# Patient Record
Sex: FEMALE | Race: WHITE | NOT HISPANIC OR LATINO | Employment: PART TIME | URBAN - METROPOLITAN AREA
[De-identification: names, ages, dates, MRNs, and addresses within clinical notes are randomized per-mention and may not be internally consistent; named-entity substitution may affect disease eponyms.]

---

## 2017-01-31 ENCOUNTER — COMMUNICATION - HEALTHEAST (OUTPATIENT)
Dept: FAMILY MEDICINE | Facility: CLINIC | Age: 47
End: 2017-01-31

## 2017-02-22 ENCOUNTER — RECORDS - HEALTHEAST (OUTPATIENT)
Dept: ADMINISTRATIVE | Facility: OTHER | Age: 47
End: 2017-02-22

## 2017-03-06 ENCOUNTER — COMMUNICATION - HEALTHEAST (OUTPATIENT)
Dept: FAMILY MEDICINE | Facility: CLINIC | Age: 47
End: 2017-03-06

## 2017-03-06 DIAGNOSIS — F41.1 GENERALIZED ANXIETY DISORDER: ICD-10-CM

## 2017-03-06 DIAGNOSIS — F40.01 AGORAPHOBIA WITH PANIC DISORDER: ICD-10-CM

## 2017-03-31 ENCOUNTER — AMBULATORY - HEALTHEAST (OUTPATIENT)
Dept: LAB | Facility: CLINIC | Age: 47
End: 2017-03-31

## 2017-03-31 ENCOUNTER — AMBULATORY - HEALTHEAST (OUTPATIENT)
Dept: FAMILY MEDICINE | Facility: CLINIC | Age: 47
End: 2017-03-31

## 2017-03-31 ENCOUNTER — COMMUNICATION - HEALTHEAST (OUTPATIENT)
Dept: FAMILY MEDICINE | Facility: CLINIC | Age: 47
End: 2017-03-31

## 2017-03-31 DIAGNOSIS — E11.9 TYPE 2 DIABETES MELLITUS (H): ICD-10-CM

## 2017-03-31 DIAGNOSIS — E11.21 DIABETIC NEPHROPATHY ASSOCIATED WITH TYPE 2 DIABETES MELLITUS (H): ICD-10-CM

## 2017-03-31 LAB
CHOLEST SERPL-MCNC: 134 MG/DL
FASTING STATUS PATIENT QL REPORTED: NO
HBA1C MFR BLD: 5.6 % (ref 3.5–6)
HDLC SERPL-MCNC: 54 MG/DL
LDLC SERPL CALC-MCNC: 64 MG/DL
TRIGL SERPL-MCNC: 78 MG/DL

## 2017-04-20 ENCOUNTER — COMMUNICATION - HEALTHEAST (OUTPATIENT)
Dept: FAMILY MEDICINE | Facility: CLINIC | Age: 47
End: 2017-04-20

## 2017-05-08 ENCOUNTER — COMMUNICATION - HEALTHEAST (OUTPATIENT)
Dept: FAMILY MEDICINE | Facility: CLINIC | Age: 47
End: 2017-05-08

## 2017-05-12 ENCOUNTER — COMMUNICATION - HEALTHEAST (OUTPATIENT)
Dept: FAMILY MEDICINE | Facility: CLINIC | Age: 47
End: 2017-05-12

## 2017-07-03 ENCOUNTER — COMMUNICATION - HEALTHEAST (OUTPATIENT)
Dept: FAMILY MEDICINE | Facility: CLINIC | Age: 47
End: 2017-07-03

## 2017-07-03 DIAGNOSIS — F41.1 GENERALIZED ANXIETY DISORDER: ICD-10-CM

## 2017-07-03 DIAGNOSIS — F40.01 AGORAPHOBIA WITH PANIC DISORDER: ICD-10-CM

## 2017-10-16 ENCOUNTER — COMMUNICATION - HEALTHEAST (OUTPATIENT)
Dept: FAMILY MEDICINE | Facility: CLINIC | Age: 47
End: 2017-10-16

## 2017-10-16 DIAGNOSIS — E11.21 DIABETIC NEPHROPATHY ASSOCIATED WITH TYPE 2 DIABETES MELLITUS (H): ICD-10-CM

## 2017-10-18 ENCOUNTER — COMMUNICATION - HEALTHEAST (OUTPATIENT)
Dept: FAMILY MEDICINE | Facility: CLINIC | Age: 47
End: 2017-10-18

## 2017-10-18 DIAGNOSIS — F41.1 GENERALIZED ANXIETY DISORDER: ICD-10-CM

## 2017-10-18 DIAGNOSIS — E11.9 TYPE 2 DIABETES MELLITUS (H): ICD-10-CM

## 2017-10-19 ENCOUNTER — HOSPITAL ENCOUNTER (OUTPATIENT)
Dept: MAMMOGRAPHY | Facility: HOSPITAL | Age: 47
Discharge: HOME | End: 2017-10-19
Attending: FAMILY MEDICINE

## 2017-10-19 DIAGNOSIS — Z12.31 VISIT FOR SCREENING MAMMOGRAM: ICD-10-CM

## 2017-11-16 ENCOUNTER — COMMUNICATION - HEALTHEAST (OUTPATIENT)
Dept: FAMILY MEDICINE | Facility: CLINIC | Age: 47
End: 2017-11-16

## 2017-11-16 DIAGNOSIS — F41.1 GENERALIZED ANXIETY DISORDER: ICD-10-CM

## 2017-11-16 DIAGNOSIS — E11.9 TYPE 2 DIABETES MELLITUS (H): ICD-10-CM

## 2017-12-05 ENCOUNTER — OFFICE VISIT - HEALTHEAST (OUTPATIENT)
Dept: FAMILY MEDICINE | Facility: CLINIC | Age: 47
End: 2017-12-05

## 2017-12-05 ENCOUNTER — COMMUNICATION - HEALTHEAST (OUTPATIENT)
Dept: FAMILY MEDICINE | Facility: CLINIC | Age: 47
End: 2017-12-05

## 2017-12-05 DIAGNOSIS — F40.01 AGORAPHOBIA WITH PANIC DISORDER: ICD-10-CM

## 2017-12-05 DIAGNOSIS — F41.1 GENERALIZED ANXIETY DISORDER: ICD-10-CM

## 2017-12-05 DIAGNOSIS — E11.21 DIABETIC NEPHROPATHY ASSOCIATED WITH TYPE 2 DIABETES MELLITUS (H): ICD-10-CM

## 2017-12-05 DIAGNOSIS — B00.9 HERPES SIMPLEX VIRUS (HSV) INFECTION: ICD-10-CM

## 2017-12-05 DIAGNOSIS — E78.5 HYPERLIPIDEMIA: ICD-10-CM

## 2017-12-05 DIAGNOSIS — E11.9 TYPE 2 DIABETES MELLITUS (H): ICD-10-CM

## 2017-12-05 LAB
CHOLEST SERPL-MCNC: 168 MG/DL
FASTING STATUS PATIENT QL REPORTED: YES
HDLC SERPL-MCNC: 54 MG/DL
LDLC SERPL CALC-MCNC: 98 MG/DL
TRIGL SERPL-MCNC: 80 MG/DL

## 2017-12-05 ASSESSMENT — MIFFLIN-ST. JEOR: SCORE: 1409.67

## 2018-01-04 ENCOUNTER — RECORDS - HEALTHEAST (OUTPATIENT)
Dept: ADMINISTRATIVE | Facility: OTHER | Age: 48
End: 2018-01-04

## 2018-03-22 ENCOUNTER — COMMUNICATION - HEALTHEAST (OUTPATIENT)
Dept: FAMILY MEDICINE | Facility: CLINIC | Age: 48
End: 2018-03-22

## 2018-03-22 DIAGNOSIS — F40.01 AGORAPHOBIA WITH PANIC DISORDER: ICD-10-CM

## 2018-03-22 DIAGNOSIS — F41.1 GENERALIZED ANXIETY DISORDER: ICD-10-CM

## 2018-09-12 ENCOUNTER — COMMUNICATION - HEALTHEAST (OUTPATIENT)
Dept: FAMILY MEDICINE | Facility: CLINIC | Age: 48
End: 2018-09-12

## 2018-09-12 DIAGNOSIS — E11.9 TYPE 2 DIABETES MELLITUS (H): ICD-10-CM

## 2018-09-13 ENCOUNTER — COMMUNICATION - HEALTHEAST (OUTPATIENT)
Dept: FAMILY MEDICINE | Facility: CLINIC | Age: 48
End: 2018-09-13

## 2018-09-13 DIAGNOSIS — F40.01 AGORAPHOBIA WITH PANIC DISORDER: ICD-10-CM

## 2018-09-13 DIAGNOSIS — F41.1 GENERALIZED ANXIETY DISORDER: ICD-10-CM

## 2018-09-27 ENCOUNTER — COMMUNICATION - HEALTHEAST (OUTPATIENT)
Dept: FAMILY MEDICINE | Facility: CLINIC | Age: 48
End: 2018-09-27

## 2018-09-27 DIAGNOSIS — E11.9 TYPE 2 DIABETES MELLITUS (H): ICD-10-CM

## 2018-10-29 ENCOUNTER — OFFICE VISIT - HEALTHEAST (OUTPATIENT)
Dept: FAMILY MEDICINE | Facility: CLINIC | Age: 48
End: 2018-10-29

## 2018-10-29 DIAGNOSIS — E11.9 TYPE 2 DIABETES MELLITUS (H): ICD-10-CM

## 2018-10-29 DIAGNOSIS — E78.5 HYPERLIPIDEMIA: ICD-10-CM

## 2018-10-29 DIAGNOSIS — E66.3 OVERWEIGHT (BMI 25.0-29.9): ICD-10-CM

## 2018-10-29 DIAGNOSIS — F41.1 GENERALIZED ANXIETY DISORDER: ICD-10-CM

## 2018-10-29 DIAGNOSIS — Z23 NEED FOR IMMUNIZATION AGAINST INFLUENZA: ICD-10-CM

## 2018-10-29 LAB
ALBUMIN SERPL-MCNC: 4 G/DL (ref 3.5–5)
ALP SERPL-CCNC: 43 U/L (ref 45–120)
ALT SERPL W P-5'-P-CCNC: 26 U/L (ref 0–45)
AMPHETAMINES UR QL SCN: NORMAL
ANION GAP SERPL CALCULATED.3IONS-SCNC: 9 MMOL/L (ref 5–18)
AST SERPL W P-5'-P-CCNC: 19 U/L (ref 0–40)
BARBITURATES UR QL: NORMAL
BENZODIAZ UR QL: NORMAL
BILIRUB SERPL-MCNC: 0.4 MG/DL (ref 0–1)
BUN SERPL-MCNC: 16 MG/DL (ref 8–22)
CALCIUM SERPL-MCNC: 9.7 MG/DL (ref 8.5–10.5)
CANNABINOIDS UR QL SCN: NORMAL
CHLORIDE BLD-SCNC: 102 MMOL/L (ref 98–107)
CHOLEST SERPL-MCNC: 160 MG/DL
CO2 SERPL-SCNC: 24 MMOL/L (ref 22–31)
COCAINE UR QL: NORMAL
CREAT SERPL-MCNC: 0.94 MG/DL (ref 0.6–1.1)
CREAT UR-MCNC: 148.5 MG/DL
CREAT UR-MCNC: 154.1 MG/DL
FASTING STATUS PATIENT QL REPORTED: YES
GFR SERPL CREATININE-BSD FRML MDRD: >60 ML/MIN/1.73M2
GLUCOSE BLD-MCNC: 142 MG/DL (ref 70–125)
HBA1C MFR BLD: 7.7 % (ref 3.5–6)
HDLC SERPL-MCNC: 45 MG/DL
LDLC SERPL CALC-MCNC: 96 MG/DL
MICROALBUMIN UR-MCNC: <0.5 MG/DL (ref 0–1.99)
MICROALBUMIN/CREAT UR: NORMAL MG/G
OPIATES UR QL SCN: NORMAL
OXYCODONE UR QL: NORMAL
PCP UR QL SCN: NORMAL
POTASSIUM BLD-SCNC: 4.7 MMOL/L (ref 3.5–5)
PROT SERPL-MCNC: 7 G/DL (ref 6–8)
SODIUM SERPL-SCNC: 135 MMOL/L (ref 136–145)
TRIGL SERPL-MCNC: 93 MG/DL
TSH SERPL DL<=0.005 MIU/L-ACNC: 1.46 UIU/ML (ref 0.3–5)

## 2018-10-29 ASSESSMENT — MIFFLIN-ST. JEOR: SCORE: 1464.11

## 2018-11-14 ENCOUNTER — COMMUNICATION - HEALTHEAST (OUTPATIENT)
Dept: FAMILY MEDICINE | Facility: CLINIC | Age: 48
End: 2018-11-14

## 2018-11-20 ENCOUNTER — AMBULATORY - HEALTHEAST (OUTPATIENT)
Dept: EDUCATION SERVICES | Facility: CLINIC | Age: 48
End: 2018-11-20

## 2018-11-20 DIAGNOSIS — E11.9 DIABETES MELLITUS (H): ICD-10-CM

## 2018-12-20 ENCOUNTER — COMMUNICATION - HEALTHEAST (OUTPATIENT)
Dept: FAMILY MEDICINE | Facility: CLINIC | Age: 48
End: 2018-12-20

## 2018-12-20 DIAGNOSIS — E11.9 TYPE 2 DIABETES MELLITUS (H): ICD-10-CM

## 2018-12-20 DIAGNOSIS — E78.5 HYPERLIPIDEMIA: ICD-10-CM

## 2018-12-23 ENCOUNTER — COMMUNICATION - HEALTHEAST (OUTPATIENT)
Dept: FAMILY MEDICINE | Facility: CLINIC | Age: 48
End: 2018-12-23

## 2018-12-23 DIAGNOSIS — E11.9 TYPE 2 DIABETES MELLITUS (H): ICD-10-CM

## 2018-12-23 DIAGNOSIS — E11.21 DIABETIC NEPHROPATHY ASSOCIATED WITH TYPE 2 DIABETES MELLITUS (H): ICD-10-CM

## 2018-12-23 DIAGNOSIS — F41.1 GENERALIZED ANXIETY DISORDER: ICD-10-CM

## 2018-12-26 ENCOUNTER — COMMUNICATION - HEALTHEAST (OUTPATIENT)
Dept: FAMILY MEDICINE | Facility: CLINIC | Age: 48
End: 2018-12-26

## 2018-12-26 DIAGNOSIS — F40.01 AGORAPHOBIA WITH PANIC DISORDER: ICD-10-CM

## 2018-12-26 DIAGNOSIS — F41.1 GENERALIZED ANXIETY DISORDER: ICD-10-CM

## 2018-12-28 ENCOUNTER — HOSPITAL ENCOUNTER (OUTPATIENT)
Dept: MAMMOGRAPHY | Facility: CLINIC | Age: 48
Discharge: HOME | End: 2018-12-28
Attending: FAMILY MEDICINE

## 2018-12-28 DIAGNOSIS — Z12.31 VISIT FOR SCREENING MAMMOGRAM: ICD-10-CM

## 2019-02-15 ENCOUNTER — COMMUNICATION - HEALTHEAST (OUTPATIENT)
Dept: FAMILY MEDICINE | Facility: CLINIC | Age: 49
End: 2019-02-15

## 2019-02-15 DIAGNOSIS — E11.9 TYPE 2 DIABETES MELLITUS WITHOUT COMPLICATION, WITHOUT LONG-TERM CURRENT USE OF INSULIN (H): ICD-10-CM

## 2019-02-15 DIAGNOSIS — E87.1 HYPONATREMIA: ICD-10-CM

## 2019-03-11 ENCOUNTER — AMBULATORY - HEALTHEAST (OUTPATIENT)
Dept: LAB | Facility: CLINIC | Age: 49
End: 2019-03-11

## 2019-03-11 DIAGNOSIS — E11.9 TYPE 2 DIABETES MELLITUS WITHOUT COMPLICATION, WITHOUT LONG-TERM CURRENT USE OF INSULIN (H): ICD-10-CM

## 2019-03-11 DIAGNOSIS — E87.1 HYPONATREMIA: ICD-10-CM

## 2019-03-11 LAB
HBA1C MFR BLD: 6.2 % (ref 3.5–6)
SODIUM SERPL-SCNC: 138 MMOL/L (ref 136–145)

## 2019-03-29 ENCOUNTER — COMMUNICATION - HEALTHEAST (OUTPATIENT)
Dept: EDUCATION SERVICES | Facility: CLINIC | Age: 49
End: 2019-03-29

## 2019-03-29 DIAGNOSIS — E11.9 DIABETES MELLITUS (H): ICD-10-CM

## 2019-04-26 ENCOUNTER — COMMUNICATION - HEALTHEAST (OUTPATIENT)
Dept: FAMILY MEDICINE | Facility: CLINIC | Age: 49
End: 2019-04-26

## 2019-05-02 ENCOUNTER — RECORDS - HEALTHEAST (OUTPATIENT)
Dept: ADMINISTRATIVE | Facility: OTHER | Age: 49
End: 2019-05-02

## 2019-05-09 ENCOUNTER — AMBULATORY - HEALTHEAST (OUTPATIENT)
Dept: FAMILY MEDICINE | Facility: CLINIC | Age: 49
End: 2019-05-09

## 2019-05-09 DIAGNOSIS — E11.65 TYPE 2 DIABETES MELLITUS WITH HYPERGLYCEMIA, WITHOUT LONG-TERM CURRENT USE OF INSULIN (H): ICD-10-CM

## 2019-07-16 ENCOUNTER — AMBULATORY - HEALTHEAST (OUTPATIENT)
Dept: FAMILY MEDICINE | Facility: CLINIC | Age: 49
End: 2019-07-16

## 2019-07-26 ENCOUNTER — COMMUNICATION - HEALTHEAST (OUTPATIENT)
Dept: EDUCATION SERVICES | Facility: CLINIC | Age: 49
End: 2019-07-26

## 2019-07-26 ENCOUNTER — COMMUNICATION - HEALTHEAST (OUTPATIENT)
Dept: FAMILY MEDICINE | Facility: CLINIC | Age: 49
End: 2019-07-26

## 2019-07-26 DIAGNOSIS — E11.9 DIABETES MELLITUS (H): ICD-10-CM

## 2019-08-26 ENCOUNTER — COMMUNICATION - HEALTHEAST (OUTPATIENT)
Dept: FAMILY MEDICINE | Facility: CLINIC | Age: 49
End: 2019-08-26

## 2019-09-12 ENCOUNTER — COMMUNICATION - HEALTHEAST (OUTPATIENT)
Dept: FAMILY MEDICINE | Facility: CLINIC | Age: 49
End: 2019-09-12

## 2019-09-12 DIAGNOSIS — F40.01 AGORAPHOBIA WITH PANIC DISORDER: ICD-10-CM

## 2019-09-12 DIAGNOSIS — F41.1 GENERALIZED ANXIETY DISORDER: ICD-10-CM

## 2019-10-10 ENCOUNTER — OFFICE VISIT - HEALTHEAST (OUTPATIENT)
Dept: FAMILY MEDICINE | Facility: CLINIC | Age: 49
End: 2019-10-10

## 2019-10-10 DIAGNOSIS — E11.29 TYPE 2 DIABETES MELLITUS WITH MICROALBUMINURIA, WITHOUT LONG-TERM CURRENT USE OF INSULIN (H): ICD-10-CM

## 2019-10-10 DIAGNOSIS — R80.9 TYPE 2 DIABETES MELLITUS WITH MICROALBUMINURIA, WITHOUT LONG-TERM CURRENT USE OF INSULIN (H): ICD-10-CM

## 2019-10-10 DIAGNOSIS — F41.1 GENERALIZED ANXIETY DISORDER: ICD-10-CM

## 2019-10-10 DIAGNOSIS — Z12.31 VISIT FOR SCREENING MAMMOGRAM: ICD-10-CM

## 2019-10-10 DIAGNOSIS — E78.5 HYPERLIPIDEMIA, UNSPECIFIED HYPERLIPIDEMIA TYPE: ICD-10-CM

## 2019-10-10 DIAGNOSIS — E66.3 OVERWEIGHT (BMI 25.0-29.9): ICD-10-CM

## 2019-10-10 DIAGNOSIS — Z00.00 HEALTH CARE MAINTENANCE: ICD-10-CM

## 2019-10-10 DIAGNOSIS — F40.01 AGORAPHOBIA WITH PANIC DISORDER: ICD-10-CM

## 2019-10-10 LAB
ALBUMIN SERPL-MCNC: 4.2 G/DL (ref 3.5–5)
ALP SERPL-CCNC: 35 U/L (ref 45–120)
ALT SERPL W P-5'-P-CCNC: 20 U/L (ref 0–45)
ANION GAP SERPL CALCULATED.3IONS-SCNC: 9 MMOL/L (ref 5–18)
AST SERPL W P-5'-P-CCNC: 18 U/L (ref 0–40)
BILIRUB SERPL-MCNC: 0.5 MG/DL (ref 0–1)
BUN SERPL-MCNC: 10 MG/DL (ref 8–22)
CALCIUM SERPL-MCNC: 9.7 MG/DL (ref 8.5–10.5)
CHLORIDE BLD-SCNC: 102 MMOL/L (ref 98–107)
CHOLEST SERPL-MCNC: 141 MG/DL
CO2 SERPL-SCNC: 25 MMOL/L (ref 22–31)
CREAT SERPL-MCNC: 0.87 MG/DL (ref 0.6–1.1)
CREAT UR-MCNC: 194.8 MG/DL
FASTING STATUS PATIENT QL REPORTED: YES
GFR SERPL CREATININE-BSD FRML MDRD: >60 ML/MIN/1.73M2
GLUCOSE BLD-MCNC: 96 MG/DL (ref 70–125)
HBA1C MFR BLD: 6 % (ref 3.5–6)
HDLC SERPL-MCNC: 54 MG/DL
HIV 1+2 AB+HIV1 P24 AG SERPL QL IA: NEGATIVE
LDLC SERPL CALC-MCNC: 72 MG/DL
MICROALBUMIN UR-MCNC: 0.65 MG/DL (ref 0–1.99)
MICROALBUMIN/CREAT UR: 3.3 MG/G
POTASSIUM BLD-SCNC: 4.5 MMOL/L (ref 3.5–5)
PROT SERPL-MCNC: 7.2 G/DL (ref 6–8)
SODIUM SERPL-SCNC: 136 MMOL/L (ref 136–145)
TRIGL SERPL-MCNC: 73 MG/DL
TSH SERPL DL<=0.005 MIU/L-ACNC: 0.93 UIU/ML (ref 0.3–5)

## 2019-10-10 ASSESSMENT — MIFFLIN-ST. JEOR: SCORE: 1328.03

## 2019-10-11 ENCOUNTER — RECORDS - HEALTHEAST (OUTPATIENT)
Dept: HEALTH INFORMATION MANAGEMENT | Facility: CLINIC | Age: 49
End: 2019-10-11

## 2019-10-11 LAB
25(OH)D3 SERPL-MCNC: 37.5 NG/ML (ref 30–80)
25(OH)D3 SERPL-MCNC: 37.5 NG/ML (ref 30–80)

## 2020-01-08 ENCOUNTER — HOSPITAL ENCOUNTER (OUTPATIENT)
Dept: MAMMOGRAPHY | Facility: CLINIC | Age: 50
Discharge: HOME | End: 2020-01-08
Attending: FAMILY MEDICINE

## 2020-01-08 DIAGNOSIS — Z12.31 VISIT FOR SCREENING MAMMOGRAM: ICD-10-CM

## 2020-01-14 ENCOUNTER — HOSPITAL ENCOUNTER (OUTPATIENT)
Dept: ULTRASOUND IMAGING | Facility: CLINIC | Age: 50
Discharge: HOME | End: 2020-01-14
Attending: FAMILY MEDICINE

## 2020-01-14 DIAGNOSIS — N64.89 BREAST ASYMMETRY: ICD-10-CM

## 2020-01-16 ENCOUNTER — COMMUNICATION - HEALTHEAST (OUTPATIENT)
Dept: FAMILY MEDICINE | Facility: CLINIC | Age: 50
End: 2020-01-16

## 2020-01-20 ENCOUNTER — COMMUNICATION - HEALTHEAST (OUTPATIENT)
Dept: FAMILY MEDICINE | Facility: CLINIC | Age: 50
End: 2020-01-20

## 2020-02-13 ENCOUNTER — OFFICE VISIT - HEALTHEAST (OUTPATIENT)
Dept: FAMILY MEDICINE | Facility: CLINIC | Age: 50
End: 2020-02-13

## 2020-02-13 DIAGNOSIS — E11.9 TYPE 2 DIABETES MELLITUS (H): ICD-10-CM

## 2020-02-13 DIAGNOSIS — F41.1 GENERALIZED ANXIETY DISORDER: ICD-10-CM

## 2020-02-13 DIAGNOSIS — M25.521 RIGHT ELBOW PAIN: ICD-10-CM

## 2020-02-13 DIAGNOSIS — G56.12 MEDIAN NEUROPATHY, LEFT: ICD-10-CM

## 2020-02-13 DIAGNOSIS — G56.11 MEDIAN NEUROPATHY, RIGHT: ICD-10-CM

## 2020-02-13 ASSESSMENT — MIFFLIN-ST. JEOR: SCORE: 1309.88

## 2020-02-21 ENCOUNTER — HOSPITAL ENCOUNTER (OUTPATIENT)
Dept: PHYSICAL MEDICINE AND REHAB | Facility: CLINIC | Age: 50
Discharge: HOME | End: 2020-02-21
Attending: FAMILY MEDICINE

## 2020-02-21 ENCOUNTER — COMMUNICATION - HEALTHEAST (OUTPATIENT)
Dept: FAMILY MEDICINE | Facility: CLINIC | Age: 50
End: 2020-02-21

## 2020-02-21 DIAGNOSIS — M25.521 RIGHT ELBOW PAIN: ICD-10-CM

## 2020-02-21 DIAGNOSIS — G56.03 BILATERAL CARPAL TUNNEL SYNDROME: ICD-10-CM

## 2020-02-21 DIAGNOSIS — G56.12 MEDIAN NEUROPATHY, LEFT: ICD-10-CM

## 2020-02-21 DIAGNOSIS — G56.11 MEDIAN NEUROPATHY, RIGHT: ICD-10-CM

## 2020-06-19 ENCOUNTER — COMMUNICATION - HEALTHEAST (OUTPATIENT)
Dept: FAMILY MEDICINE | Facility: CLINIC | Age: 50
End: 2020-06-19

## 2020-06-19 DIAGNOSIS — F41.1 GENERALIZED ANXIETY DISORDER: ICD-10-CM

## 2020-06-19 DIAGNOSIS — F40.01 AGORAPHOBIA WITH PANIC DISORDER: ICD-10-CM

## 2020-06-24 ENCOUNTER — COMMUNICATION - HEALTHEAST (OUTPATIENT)
Dept: FAMILY MEDICINE | Facility: CLINIC | Age: 50
End: 2020-06-24

## 2020-09-24 ENCOUNTER — OFFICE VISIT - HEALTHEAST (OUTPATIENT)
Dept: FAMILY MEDICINE | Facility: CLINIC | Age: 50
End: 2020-09-24

## 2020-09-24 DIAGNOSIS — F40.01 AGORAPHOBIA WITH PANIC DISORDER: ICD-10-CM

## 2020-09-24 DIAGNOSIS — E11.9 DIABETES MELLITUS (H): ICD-10-CM

## 2020-09-24 DIAGNOSIS — B00.9 HERPES SIMPLEX VIRUS (HSV) INFECTION: ICD-10-CM

## 2020-09-24 DIAGNOSIS — Z23 IMMUNIZATION DUE: ICD-10-CM

## 2020-09-24 DIAGNOSIS — Z12.11 COLON CANCER SCREENING: ICD-10-CM

## 2020-09-24 DIAGNOSIS — E11.29 TYPE 2 DIABETES MELLITUS WITH MICROALBUMINURIA, WITHOUT LONG-TERM CURRENT USE OF INSULIN (H): ICD-10-CM

## 2020-09-24 DIAGNOSIS — R80.9 TYPE 2 DIABETES MELLITUS WITH MICROALBUMINURIA, WITHOUT LONG-TERM CURRENT USE OF INSULIN (H): ICD-10-CM

## 2020-09-24 DIAGNOSIS — F41.1 GENERALIZED ANXIETY DISORDER: ICD-10-CM

## 2020-09-24 DIAGNOSIS — E78.5 HYPERLIPIDEMIA, UNSPECIFIED HYPERLIPIDEMIA TYPE: ICD-10-CM

## 2020-09-24 LAB
ALBUMIN SERPL-MCNC: 4.4 G/DL (ref 3.5–5)
ALP SERPL-CCNC: 33 U/L (ref 45–120)
ALT SERPL W P-5'-P-CCNC: 14 U/L (ref 0–45)
ANION GAP SERPL CALCULATED.3IONS-SCNC: 8 MMOL/L (ref 5–18)
AST SERPL W P-5'-P-CCNC: 18 U/L (ref 0–40)
BILIRUB SERPL-MCNC: 0.5 MG/DL (ref 0–1)
BUN SERPL-MCNC: 9 MG/DL (ref 8–22)
CALCIUM SERPL-MCNC: 9.6 MG/DL (ref 8.5–10.5)
CHLORIDE BLD-SCNC: 102 MMOL/L (ref 98–107)
CO2 SERPL-SCNC: 26 MMOL/L (ref 22–31)
CREAT SERPL-MCNC: 0.82 MG/DL (ref 0.6–1.1)
CREAT UR-MCNC: 104.8 MG/DL
GFR SERPL CREATININE-BSD FRML MDRD: >60 ML/MIN/1.73M2
GLUCOSE BLD-MCNC: 78 MG/DL (ref 70–125)
HBA1C MFR BLD: 6 %
MICROALBUMIN UR-MCNC: <0.5 MG/DL (ref 0–1.99)
MICROALBUMIN/CREAT UR: NORMAL MG/G{CREAT}
POTASSIUM BLD-SCNC: 4.4 MMOL/L (ref 3.5–5)
PROT SERPL-MCNC: 7 G/DL (ref 6–8)
SODIUM SERPL-SCNC: 136 MMOL/L (ref 136–145)

## 2020-09-24 RX ORDER — VENLAFAXINE HYDROCHLORIDE 75 MG/1
75 CAPSULE, EXTENDED RELEASE ORAL DAILY
Qty: 90 CAPSULE | Refills: 3 | Status: SHIPPED | OUTPATIENT
Start: 2020-09-24 | End: 2021-12-28

## 2020-09-24 RX ORDER — GLUCOSAMINE HCL/CHONDROITIN SU 500-400 MG
1 CAPSULE ORAL DAILY
Qty: 100 STRIP | Refills: 3 | Status: SHIPPED | OUTPATIENT
Start: 2020-09-24 | End: 2023-12-31

## 2020-09-24 RX ORDER — SEMAGLUTIDE 1.34 MG/ML
0.5 INJECTION, SOLUTION SUBCUTANEOUS WEEKLY
Qty: 3 SYRINGE | Refills: 3 | Status: SHIPPED | OUTPATIENT
Start: 2020-09-24 | End: 2021-09-14

## 2020-09-24 RX ORDER — LISINOPRIL 5 MG/1
5 TABLET ORAL DAILY
Qty: 90 TABLET | Refills: 3 | Status: SHIPPED | OUTPATIENT
Start: 2020-09-24 | End: 2021-12-28

## 2020-09-24 RX ORDER — VALACYCLOVIR HYDROCHLORIDE 1 G/1
1000 TABLET, FILM COATED ORAL 2 TIMES DAILY
Qty: 10 TABLET | Refills: 6 | Status: SHIPPED | OUTPATIENT
Start: 2020-09-24 | End: 2021-11-06

## 2020-09-24 RX ORDER — SIMVASTATIN 20 MG
20 TABLET ORAL AT BEDTIME
Qty: 90 TABLET | Refills: 3 | Status: SHIPPED | OUTPATIENT
Start: 2020-09-24 | End: 2021-09-30

## 2020-11-10 ENCOUNTER — COMMUNICATION - HEALTHEAST (OUTPATIENT)
Dept: FAMILY MEDICINE | Facility: CLINIC | Age: 50
End: 2020-11-10

## 2020-11-10 ENCOUNTER — OFFICE VISIT - HEALTHEAST (OUTPATIENT)
Dept: FAMILY MEDICINE | Facility: CLINIC | Age: 50
End: 2020-11-10

## 2020-11-10 DIAGNOSIS — M54.40 ACUTE LOW BACK PAIN WITH SCIATICA, SCIATICA LATERALITY UNSPECIFIED, UNSPECIFIED BACK PAIN LATERALITY: ICD-10-CM

## 2020-11-12 ENCOUNTER — OFFICE VISIT - HEALTHEAST (OUTPATIENT)
Dept: FAMILY MEDICINE | Facility: CLINIC | Age: 50
End: 2020-11-12

## 2020-11-12 DIAGNOSIS — M54.50 ACUTE RIGHT-SIDED LOW BACK PAIN WITHOUT SCIATICA: ICD-10-CM

## 2020-11-12 RX ORDER — LIDOCAINE 50 MG/G
PATCH TOPICAL
Qty: 30 PATCH | Refills: 11 | Status: SHIPPED | OUTPATIENT
Start: 2020-11-12 | End: 2021-07-16

## 2020-11-12 RX ORDER — HYDROMORPHONE HYDROCHLORIDE 2 MG/1
2 TABLET ORAL EVERY 6 HOURS PRN
Qty: 20 TABLET | Refills: 0 | Status: SHIPPED | OUTPATIENT
Start: 2020-11-12 | End: 2021-07-16

## 2020-11-17 ENCOUNTER — COMMUNICATION - HEALTHEAST (OUTPATIENT)
Dept: FAMILY MEDICINE | Facility: CLINIC | Age: 50
End: 2020-11-17

## 2020-12-01 ENCOUNTER — COMMUNICATION - HEALTHEAST (OUTPATIENT)
Dept: FAMILY MEDICINE | Facility: CLINIC | Age: 50
End: 2020-12-01

## 2020-12-15 ENCOUNTER — COMMUNICATION - HEALTHEAST (OUTPATIENT)
Dept: FAMILY MEDICINE | Facility: CLINIC | Age: 50
End: 2020-12-15

## 2020-12-15 DIAGNOSIS — E11.29 TYPE 2 DIABETES MELLITUS WITH MICROALBUMINURIA, WITHOUT LONG-TERM CURRENT USE OF INSULIN (H): ICD-10-CM

## 2020-12-15 DIAGNOSIS — E78.5 HYPERLIPIDEMIA, UNSPECIFIED HYPERLIPIDEMIA TYPE: ICD-10-CM

## 2020-12-15 DIAGNOSIS — R80.9 TYPE 2 DIABETES MELLITUS WITH MICROALBUMINURIA, WITHOUT LONG-TERM CURRENT USE OF INSULIN (H): ICD-10-CM

## 2020-12-15 DIAGNOSIS — Z11.59 SCREENING FOR VIRAL DISEASE: ICD-10-CM

## 2020-12-15 DIAGNOSIS — R00.2 PALPITATIONS: ICD-10-CM

## 2020-12-17 ENCOUNTER — COMMUNICATION - HEALTHEAST (OUTPATIENT)
Dept: FAMILY MEDICINE | Facility: CLINIC | Age: 50
End: 2020-12-17

## 2020-12-18 ENCOUNTER — TRANSFERRED RECORDS (OUTPATIENT)
Dept: HEALTH INFORMATION MANAGEMENT | Facility: CLINIC | Age: 50
End: 2020-12-18
Payer: COMMERCIAL

## 2020-12-18 ENCOUNTER — RECORDS - HEALTHEAST (OUTPATIENT)
Dept: ADMINISTRATIVE | Facility: OTHER | Age: 50
End: 2020-12-18

## 2020-12-18 ENCOUNTER — COMMUNICATION - HEALTHEAST (OUTPATIENT)
Dept: FAMILY MEDICINE | Facility: CLINIC | Age: 50
End: 2020-12-18

## 2020-12-21 ENCOUNTER — AMBULATORY - HEALTHEAST (OUTPATIENT)
Dept: LAB | Facility: CLINIC | Age: 50
End: 2020-12-21

## 2020-12-21 ENCOUNTER — AMBULATORY - HEALTHEAST (OUTPATIENT)
Dept: NURSING | Facility: CLINIC | Age: 50
End: 2020-12-21

## 2020-12-21 DIAGNOSIS — R00.2 PALPITATIONS: ICD-10-CM

## 2020-12-21 DIAGNOSIS — R80.9 TYPE 2 DIABETES MELLITUS WITH MICROALBUMINURIA, WITHOUT LONG-TERM CURRENT USE OF INSULIN (H): ICD-10-CM

## 2020-12-21 DIAGNOSIS — E11.29 TYPE 2 DIABETES MELLITUS WITH MICROALBUMINURIA, WITHOUT LONG-TERM CURRENT USE OF INSULIN (H): ICD-10-CM

## 2020-12-21 DIAGNOSIS — E78.5 HYPERLIPIDEMIA, UNSPECIFIED HYPERLIPIDEMIA TYPE: ICD-10-CM

## 2020-12-21 DIAGNOSIS — Z11.59 SCREENING FOR VIRAL DISEASE: ICD-10-CM

## 2020-12-21 LAB
ALBUMIN SERPL-MCNC: 4.2 G/DL (ref 3.5–5)
ALP SERPL-CCNC: 31 U/L (ref 45–120)
ALT SERPL W P-5'-P-CCNC: 12 U/L (ref 0–45)
ANION GAP SERPL CALCULATED.3IONS-SCNC: 10 MMOL/L (ref 5–18)
AST SERPL W P-5'-P-CCNC: 15 U/L (ref 0–40)
BILIRUB SERPL-MCNC: 0.5 MG/DL (ref 0–1)
BUN SERPL-MCNC: 8 MG/DL (ref 8–22)
CALCIUM SERPL-MCNC: 9.1 MG/DL (ref 8.5–10.5)
CHLORIDE BLD-SCNC: 104 MMOL/L (ref 98–107)
CHOLEST SERPL-MCNC: 133 MG/DL
CO2 SERPL-SCNC: 24 MMOL/L (ref 22–31)
CREAT SERPL-MCNC: 0.75 MG/DL (ref 0.6–1.1)
FASTING STATUS PATIENT QL REPORTED: NO
GFR SERPL CREATININE-BSD FRML MDRD: >60 ML/MIN/1.73M2
GLUCOSE BLD-MCNC: 109 MG/DL (ref 70–125)
HBA1C MFR BLD: 5.4 %
HDLC SERPL-MCNC: 60 MG/DL
LDLC SERPL CALC-MCNC: 62 MG/DL
MAGNESIUM SERPL-MCNC: 1.6 MG/DL (ref 1.8–2.6)
POTASSIUM BLD-SCNC: 4.3 MMOL/L (ref 3.5–5)
PROT SERPL-MCNC: 7 G/DL (ref 6–8)
SODIUM SERPL-SCNC: 138 MMOL/L (ref 136–145)
TRIGL SERPL-MCNC: 57 MG/DL
TSH SERPL DL<=0.005 MIU/L-ACNC: 1.31 UIU/ML (ref 0.3–5)

## 2020-12-22 ENCOUNTER — AMBULATORY - HEALTHEAST (OUTPATIENT)
Dept: FAMILY MEDICINE | Facility: CLINIC | Age: 50
End: 2020-12-22

## 2020-12-22 DIAGNOSIS — I49.3 PVC (PREMATURE VENTRICULAR CONTRACTION): ICD-10-CM

## 2020-12-22 DIAGNOSIS — R00.2 PALPITATIONS: ICD-10-CM

## 2020-12-22 DIAGNOSIS — E83.42 HYPOMAGNESEMIA: ICD-10-CM

## 2020-12-22 LAB — HCV AB SERPL QL IA: NEGATIVE

## 2020-12-22 RX ORDER — MAGNESIUM CHLORIDE 71.5 G/G
1 TABLET ORAL DAILY
Qty: 100 TABLET | Refills: 1 | Status: SHIPPED | OUTPATIENT
Start: 2020-12-22 | End: 2021-07-16

## 2020-12-29 ENCOUNTER — COMMUNICATION - HEALTHEAST (OUTPATIENT)
Dept: FAMILY MEDICINE | Facility: CLINIC | Age: 50
End: 2020-12-29

## 2020-12-29 DIAGNOSIS — E11.29 TYPE 2 DIABETES MELLITUS WITH MICROALBUMINURIA, WITHOUT LONG-TERM CURRENT USE OF INSULIN (H): ICD-10-CM

## 2020-12-29 DIAGNOSIS — F41.1 GENERALIZED ANXIETY DISORDER: ICD-10-CM

## 2020-12-29 DIAGNOSIS — R80.9 TYPE 2 DIABETES MELLITUS WITH MICROALBUMINURIA, WITHOUT LONG-TERM CURRENT USE OF INSULIN (H): ICD-10-CM

## 2020-12-29 DIAGNOSIS — F40.01 AGORAPHOBIA WITH PANIC DISORDER: ICD-10-CM

## 2021-01-05 LAB
ATRIAL RATE - MUSE: 83 BPM
DIASTOLIC BLOOD PRESSURE - MUSE: NORMAL
INTERPRETATION ECG - MUSE: NORMAL
P AXIS - MUSE: 64 DEGREES
PR INTERVAL - MUSE: 132 MS
QRS DURATION - MUSE: 76 MS
QT - MUSE: 368 MS
QTC - MUSE: 432 MS
R AXIS - MUSE: -12 DEGREES
SYSTOLIC BLOOD PRESSURE - MUSE: NORMAL
T AXIS - MUSE: 51 DEGREES
VENTRICULAR RATE- MUSE: 83 BPM

## 2021-03-03 ENCOUNTER — COMMUNICATION - HEALTHEAST (OUTPATIENT)
Dept: FAMILY MEDICINE | Facility: CLINIC | Age: 51
End: 2021-03-03

## 2021-03-15 ENCOUNTER — COMMUNICATION - HEALTHEAST (OUTPATIENT)
Dept: FAMILY MEDICINE | Facility: CLINIC | Age: 51
End: 2021-03-15

## 2021-03-31 ENCOUNTER — HOSPITAL ENCOUNTER (OUTPATIENT)
Dept: MAMMOGRAPHY | Facility: CLINIC | Age: 51
Discharge: HOME | End: 2021-03-31
Attending: FAMILY MEDICINE

## 2021-03-31 DIAGNOSIS — Z12.31 VISIT FOR SCREENING MAMMOGRAM: ICD-10-CM

## 2021-04-22 ENCOUNTER — COMMUNICATION - HEALTHEAST (OUTPATIENT)
Dept: FAMILY MEDICINE | Facility: CLINIC | Age: 51
End: 2021-04-22

## 2021-04-22 DIAGNOSIS — F41.1 GENERALIZED ANXIETY DISORDER: ICD-10-CM

## 2021-04-22 DIAGNOSIS — F40.01 AGORAPHOBIA WITH PANIC DISORDER: ICD-10-CM

## 2021-04-22 RX ORDER — LORAZEPAM 1 MG/1
1 TABLET ORAL EVERY 8 HOURS PRN
Qty: 30 TABLET | Refills: 0 | Status: SHIPPED | OUTPATIENT
Start: 2021-04-22 | End: 2021-11-08

## 2021-05-27 NOTE — TELEPHONE ENCOUNTER
RN cannot approve Refill Request    RN can NOT refill this medication med is not covered by policy/route to provider     . Last office visit: 10/29/2018 Tara Elliott MD Last Physical: Visit date not found Last MTM visit: Visit date not found Last visit same specialty: Visit date not found.  Next visit within 3 mo: Visit date not found  Next physical within 3 mo: Visit date not found      Joyce You, [facility] Connection Triage/Med Refill 3/29/2019    Requested Prescriptions   Pending Prescriptions Disp Refills     OZEMPIC 0.25 mg or 0.5 mg(2 mg/1.5 mL) PnIj [Pharmacy Med Name: OZEMPIC 0.25-0.5 MG DOSE PEN] 1.5 Syringe 3     Sig: INJECT 0.25 MG UNDER THE SKIN EVERY 7 DAYS.    There is no refill protocol information for this order

## 2021-05-28 ENCOUNTER — RECORDS - HEALTHEAST (OUTPATIENT)
Dept: ADMINISTRATIVE | Facility: CLINIC | Age: 51
End: 2021-05-28

## 2021-05-30 NOTE — PROGRESS NOTES
The patient was on the quality list for pap smear. Chart was reviewed:    On 11/28/11, she had a total hysterectomy for bleeding + cramping.     Pap smear is NOT indicated.

## 2021-05-31 ENCOUNTER — RECORDS - HEALTHEAST (OUTPATIENT)
Dept: ADMINISTRATIVE | Facility: CLINIC | Age: 51
End: 2021-05-31

## 2021-05-31 VITALS — WEIGHT: 175 LBS | HEIGHT: 65 IN | BODY MASS INDEX: 29.16 KG/M2

## 2021-05-31 NOTE — TELEPHONE ENCOUNTER
Called eye clinic to have recent eye exam faxed to [phone] attn: Dr. Elliott.  Eye clinic said they'd send it over

## 2021-06-01 NOTE — TELEPHONE ENCOUNTER
"Attempted call, left detail voice mail to call back       \" okay to relay message below and schedule next appt with provider. \"   "

## 2021-06-01 NOTE — TELEPHONE ENCOUNTER
Controlled Substance Refill Request  Medication:   Requested Prescriptions     Pending Prescriptions Disp Refills     LORazepam (ATIVAN) 1 MG tablet [Pharmacy Med Name: LORAZEPAM 1 MG TABLET] 30 tablet      Sig: TAKE 1 TABLET (1 MG TOTAL) BY MOUTH EVERY 8 (EIGHT) HOURS AS NEEDED FOR ANXIETY.     Date Last Fill: 12/27/18  [id number]   Submit electronically to pharmacy  Controlled Substance Agreement on File:   Encounter-Level CSA Scan Date:    There are no encounter-level csa scan date.       Last office visit: Last office visit pertaining to requested medication was 10/29/18.

## 2021-06-02 VITALS — WEIGHT: 187 LBS | BODY MASS INDEX: 31.16 KG/M2 | HEIGHT: 65 IN

## 2021-06-02 VITALS — WEIGHT: 187.7 LBS | BODY MASS INDEX: 31.23 KG/M2

## 2021-06-02 NOTE — PROGRESS NOTES
"Office Visit  [facility]  Date of Service: 10/10/2019      Subjective   Irene Terrazas is a 49 y.o. female who presents for diabete check    Type 2 diabetes  Irene does not check her blood sugars regularly.  Will check if she is feeling high or low.  She does got a new puppy and has been walking the puppy a lot.  She has occasional low blood sugars if she does not eat.    Numb hands  Has been going on for a while.  She had an EMG a long time ago (I could not find the results on her chart).  She has not really paid attention to where her hands are numb.  It is worse on the right.  She has to shake her hands in the morning.  Having some significant discomfort around the elbow especially on the right.    Objective   /70 (Patient Site: Right Arm, Patient Position: Sitting, Cuff Size: Adult Regular)   Pulse 96   Temp 98.1  F (36.7  C) (Oral)   Resp 18   Ht 5' 5\" (1.651 m)   Wt 157 lb (71.2 kg)   LMP 01/01/2012   BMI 26.13 kg/m     She reports that she has never smoked. She has never used smokeless tobacco.    Gen: Alert, no apparent distress.  Heart: Regular rate and rhythm, no murmurs.  Lungs: Clear to auscultation bilaterally, no increased work of breathing.  Abdomen: Soft, non-tender, non-distended, bowel sounds normal.  Extremities: No clubbing, cyanosis, edema.  Monofilament testing is intact.  Intact circulation.  No sores.    Results for orders placed or performed in visit on 10/10/19   HIV Antigen/Antibody Screening Cascade   Result Value Ref Range    HIV Antigen / Antibody Negative Negative   Glycosylated Hemoglobin A1c   Result Value Ref Range    Hemoglobin A1c 6.0 3.5 - 6.0 %   Microalbumin, Random Urine   Result Value Ref Range    Microalbumin, Random Urine 0.65 0.00 - 1.99 mg/dL    Creatinine, Urine 194.8 mg/dL    Microalbumin/Creatinine Ratio Random Urine 3.3 <=19.9 mg/g   Comprehensive Metabolic Panel   Result Value Ref Range    Sodium 136 136 - 145 " mmol/L    Potassium 4.5 3.5 - 5.0 mmol/L    Chloride 102 98 - 107 mmol/L    CO2 25 22 - 31 mmol/L    Anion Gap, Calculation 9 5 - 18 mmol/L    Glucose 96 70 - 125 mg/dL    BUN 10 8 - 22 mg/dL    Creatinine 0.87 0.60 - 1.10 mg/dL    GFR MDRD Af Amer >60 >60 mL/min/1.73m2    GFR MDRD Non Af Amer >60 >60 mL/min/1.73m2    Bilirubin, Total 0.5 0.0 - 1.0 mg/dL    Calcium 9.7 8.5 - 10.5 mg/dL    Protein, Total 7.2 6.0 - 8.0 g/dL    Albumin 4.2 3.5 - 5.0 g/dL    Alkaline Phosphatase 35 (L) 45 - 120 U/L    AST 18 0 - 40 U/L    ALT 20 0 - 45 U/L   Lipid Cascade   Result Value Ref Range    Cholesterol 141 <=199 mg/dL    Triglycerides 73 <=149 mg/dL    HDL Cholesterol 54 >=50 mg/dL    LDL Calculated 72 <=129 mg/dL    Patient Fasting > 8hrs? Yes    Thyroid Cascade   Result Value Ref Range    TSH 0.93 0.30 - 5.00 uIU/mL     No results found.    Assessment & Plan     1. Type 2 diabetes, well controlled.  Continue current medications.  Labs ordered as below.  2. Hyperlipidemia.  Labs ordered.  3. Health maintenance: Screening mammogram ordered.  Flu shot given.  4. Generalized anxiety disorder with panic disorder with agoraphobia.  Refilled venlafaxine and lorazepam.  Treatment plan: Continue rare use of lorazepam for breakthrough anxiety.      Order Summary                                                      1. Type 2 diabetes mellitus with microalbuminuria, without long-term current use of insulin (H)  Glycosylated Hemoglobin A1c    Microalbumin, Random Urine    Comprehensive Metabolic Panel    Thyroid Cascade    Vitamin D, Total (25-Hydroxy)    lisinopril (PRINIVIL,ZESTRIL) 5 MG tablet    metFORMIN (GLUCOPHAGE) 1000 MG tablet    semaglutide (OZEMPIC) 0.25 mg or 0.5 mg(2 mg/1.5 mL) PnIj    simvastatin (ZOCOR) 20 MG tablet   2. Hyperlipidemia, unspecified hyperlipidemia type  Lipid Cascade    simvastatin (ZOCOR) 20 MG tablet   3. Overweight (BMI 25.0-29.9)     4. Health care maintenance  HIV Antigen/Antibody Screening Cascade     Influenza, Seasonal Quad, PF =/> 6months   5. Visit for screening mammogram  Mammo Screening Bilateral   6. Generalized anxiety disorder  venlafaxine (EFFEXOR-XR) 75 MG 24 hr capsule    LORazepam (ATIVAN) 1 MG tablet   7. Panic Disorder With Agoraphobia  LORazepam (ATIVAN) 1 MG tablet      No future appointments.    Completed by: Tara Elliott M.D., [facility]. 10/10/2019 10:52 AM.  This transcription uses voice recognition software, which may contain typographical errors.

## 2021-06-03 ENCOUNTER — RECORDS - HEALTHEAST (OUTPATIENT)
Dept: ADMINISTRATIVE | Facility: CLINIC | Age: 51
End: 2021-06-03

## 2021-06-03 VITALS
RESPIRATION RATE: 18 BRPM | DIASTOLIC BLOOD PRESSURE: 70 MMHG | TEMPERATURE: 98.1 F | HEIGHT: 65 IN | SYSTOLIC BLOOD PRESSURE: 110 MMHG | HEART RATE: 96 BPM | WEIGHT: 157 LBS | BODY MASS INDEX: 26.16 KG/M2

## 2021-06-04 VITALS
SYSTOLIC BLOOD PRESSURE: 108 MMHG | WEIGHT: 153 LBS | HEIGHT: 65 IN | RESPIRATION RATE: 18 BRPM | HEART RATE: 84 BPM | BODY MASS INDEX: 25.49 KG/M2 | TEMPERATURE: 97.1 F | DIASTOLIC BLOOD PRESSURE: 68 MMHG

## 2021-06-05 VITALS
WEIGHT: 155 LBS | BODY MASS INDEX: 25.79 KG/M2 | HEART RATE: 80 BPM | DIASTOLIC BLOOD PRESSURE: 67 MMHG | RESPIRATION RATE: 16 BRPM | TEMPERATURE: 97.6 F | SYSTOLIC BLOOD PRESSURE: 108 MMHG

## 2021-06-05 VITALS
RESPIRATION RATE: 12 BRPM | BODY MASS INDEX: 26.13 KG/M2 | HEART RATE: 72 BPM | SYSTOLIC BLOOD PRESSURE: 114 MMHG | TEMPERATURE: 97.5 F | DIASTOLIC BLOOD PRESSURE: 60 MMHG | WEIGHT: 157 LBS

## 2021-06-05 NOTE — TELEPHONE ENCOUNTER
Irene Terrazas,     Dr. Elliott is not in office today. Are you willing to wait until 1/17/2020 when Dr. Elliott returns to clinic for a response? Otherwise I can send your message to the covering provider Dr. Field.      Sincerely,   CORDELL Chu

## 2021-06-06 NOTE — TELEPHONE ENCOUNTER
Please tell Irene that her EMG will tunnel syndrome on both sides, moderate severity.  I would recommend seeing orthopedic surgery for discussion of surgical treatment due to the demands of her job.  I will put in a referral for that.

## 2021-06-06 NOTE — TELEPHONE ENCOUNTER
"Left message to call back.  Call #3.  \"okay to relay message.\"    Called 3 times.  Okay for letter?  "

## 2021-06-06 NOTE — PROGRESS NOTES
"Office Visit  [facility]  Date of Service: 02/13/2020      Subjective   Irene Terrazas is a 49 y.o. female who presents for elbow pain'    Right lateral elbow pain  Adelina is here today for evaluation of right elbow pain.  It has been present for a couple of months.  The pain is located over the posterior lateral elbow.  It almost feels like it is in the bone.  At times it radiates up to the shoulder and down to the hand.  She also has numbness in the first 3 fingers of the right greater than left hand.  Symptoms are made worse at work.  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  She also has 2 9-month-old [place] puppies that she walks regularly.  Has not yet had an EMG or other diagnostic evaluation for this.  She did not have an injury.    Diabetes  Diabetes is been well controlled.  Ozempic is helping.  In October she had normal TSH and hemoglobin A1c.    Objective   /68 (Patient Site: Left Arm, Patient Position: Sitting, Cuff Size: Adult Regular)   Pulse 84   Temp 97.1  F (36.2  C) (Oral)   Resp 18   Ht 5' 5\" (1.651 m)   Wt 153 lb (69.4 kg)   LMP 01/01/2012   BMI 25.46 kg/m     She reports that she has never smoked. She has never used smokeless tobacco.    Gen: Alert, no apparent distress.  Heart: Regular rate and rhythm, no murmurs.  Lungs: Clear to auscultation bilaterally, no increased work of breathing.  Abdomen: Soft, non-tender, non-distended, bowel sounds normal.  Extremities: No clubbing, cyanosis, edema.    Results for orders placed or performed in visit on 10/10/19   HIV Antigen/Antibody Screening Cascade   Result Value Ref Range    HIV Antigen / Antibody Negative Negative   Glycosylated Hemoglobin A1c   Result Value Ref Range    Hemoglobin A1c 6.0 3.5 - 6.0 %   Microalbumin, Random Urine   Result Value Ref Range    Microalbumin, Random Urine 0.65 0.00 - 1.99 mg/dL    Creatinine, Urine 194.8 mg/dL    " Microalbumin/Creatinine Ratio Random Urine 3.3 <=19.9 mg/g   Comprehensive Metabolic Panel   Result Value Ref Range    Sodium 136 136 - 145 mmol/L    Potassium 4.5 3.5 - 5.0 mmol/L    Chloride 102 98 - 107 mmol/L    CO2 25 22 - 31 mmol/L    Anion Gap, Calculation 9 5 - 18 mmol/L    Glucose 96 70 - 125 mg/dL    BUN 10 8 - 22 mg/dL    Creatinine 0.87 0.60 - 1.10 mg/dL    GFR MDRD Af Amer >60 >60 mL/min/1.73m2    GFR MDRD Non Af Amer >60 >60 mL/min/1.73m2    Bilirubin, Total 0.5 0.0 - 1.0 mg/dL    Calcium 9.7 8.5 - 10.5 mg/dL    Protein, Total 7.2 6.0 - 8.0 g/dL    Albumin 4.2 3.5 - 5.0 g/dL    Alkaline Phosphatase 35 (L) 45 - 120 U/L    AST 18 0 - 40 U/L    ALT 20 0 - 45 U/L   Lipid Cascade   Result Value Ref Range    Cholesterol 141 <=199 mg/dL    Triglycerides 73 <=149 mg/dL    HDL Cholesterol 54 >=50 mg/dL    LDL Calculated 72 <=129 mg/dL    Patient Fasting > 8hrs? Yes    Thyroid Cascade   Result Value Ref Range    TSH 0.93 0.30 - 5.00 uIU/mL   Vitamin D, Total (25-Hydroxy)   Result Value Ref Range    Vitamin D, Total (25-Hydroxy) 37.5 30.0 - 80.0 ng/mL     No results found.    Assessment & Plan     1. Bilateral median neuropathy and right elbow pain, suspicious for impingement of median nerve at elbow.  Referral made for bilateral EMG.  Referral to orthopedic surgery, if needed.      Order Summary                                                      1. Type 2 diabetes mellitus (H)   DIABETES FOOT EXAM   2. Generalized anxiety disorder  venlafaxine (EFFEXOR-XR) 75 MG 24 hr capsule   3. Right elbow pain  EMG- Both Arms    CANCELED: EMG- Right Arm   4. Median neuropathy, right  EMG- Both Arms    CANCELED: EMG- Right Arm   5. Median neuropathy, left  EMG- Both Arms      Future Appointments   Date Time Provider Department Center   2/21/2020  2:20 PM Clayton Klein, DO [facility]       Completed by: Tara Elliott M.D., [facility]. 2/13/2020 11:32 AM.  This transcription uses voice recognition  software, which may contain typographical errors.

## 2021-06-06 NOTE — PROGRESS NOTES
Patient presents at the request of Dr. Tara Elliott for bilateral upper extremity EMG.  She has right greater than left hand numbness and tingling digits 1-3.  Has been worsening over years.  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  She is also having some right elbow pain.    EMG/NCS  results: Please see scanned full report.    Comment NCS: Abnormal study:    1.  Prolonged latency right median SNAP.  2.  Prolonged bilateral median transcarpal and CMAP latency.  3.  Prolonged bilateral median versus ulnar transcarpal latency comparison.  4. Normal bilateral ulnar studies.     Comment EMG: Normal study.  1.  Normal needle EMG bilateral upper extremities.    Interpretation: Abnormal study: There is electrodiagnostic evidence of:    1.  Bilateral median neuropathy at the wrist consistent with entrapment in the carpal tunnel, moderate in severity.  Right equal to left in severity.    2. There is no electrodiagnostic evidence of cervical radiculopathy, brachial plexopathy, or ulnar neuropathy in the bilateral upper extremities.    Note: The patient will continue to wear carpal tunnel splints and follow-up with primary care provider for further recommendations.    The testing was completed in its entirety by the physician.      It was our pleasure caring for your patient today, if there any questions or concerns please do not hesitate to contact us.

## 2021-06-09 NOTE — TELEPHONE ENCOUNTER
"Called and left message for pt to return call.# 1  \" Okay to relay message\"    "
"Called and left message for pt to return call.# 2  \" Okay to relay message\"    "
"Called and left message for pt to return call.# 3  \" Okay to relay message\"  Okay to send letter?  "
Controlled substance agreement sent via [employer]. Please ask her to complete and return it. Will defer drug screen due to     Irene was seen today for medication refill.    Diagnoses and all orders for this visit:    Panic Disorder With Agoraphobia    Generalized anxiety disorder         Last Office Visit 2/13/2020 Tara Elliott MD  Last Physical (in last year) Visit date not found   No future appointments.    Controlled Substance Agreement  On file (>1 year ago).    Last Drug Screen  Lab Results   Component Value Date    AMPHET Screen Negative 10/29/2018    HEBENZODIAZ Screen Negative 10/29/2018    PCP Screen Negative 10/29/2018    THC Screen Negative 10/29/2018    BARBIT Screen Negative 10/29/2018    COCAINEMETAB Screen Negative 10/29/2018    OPIATES Screen Negative 10/29/2018    CREAUR 194.8 10/10/2019        [place]  Review  Fill Date ID Written Drug Qty Days Prescriber Rx # Pharmacy Refill Daily Dose * Pymt Type   03/23/2020 1 10/10/2019 Lorazepam 1 Mg Tablet  30.00 10 [place] [id number] [place] (9631) 0/0 3.00 [place]  09/13/2019 1 09/13/2019 Lorazepam 1 Mg Tablet  30.00 10 [place] [id number] [facility] ([id number]) 0/0 3.00 [place]  *Per CDC guidance,   
Medication refill requested. Please authorize medication if appropriate.     
no

## 2021-06-11 NOTE — PROGRESS NOTES
Office Visit  [facility]  Date of Service: 09/24/2020      Subjective   Irene Terrazas is a 50 y.o. female who presents for Diabetes (F/U on diabetes and med check)    Diabetes  Doing good  Not checking sugars - has new monitor and strips don't match  Running with the dog - 3-7 miles a day  Eye exam - [facility] in [place]       Little interest or pleasure in doing things: Several days  Feeling down, depressed, or hopeless: Several daysNo data recorded   Objective   /60 (Patient Site: Left Arm, Patient Position: Sitting, Cuff Size: Adult Regular)   Pulse 72   Temp 97.5  F (36.4  C) (Oral)   Resp 12   Wt 157 lb (71.2 kg)   LMP 01/01/2012   BMI 26.13 kg/m     She reports that she has never smoked. She has never used smokeless tobacco.  Wt Readings from Last 6 Encounters:   09/24/20 157 lb (71.2 kg)   02/13/20 153 lb (69.4 kg)   10/10/19 157 lb (71.2 kg)   11/20/18 187 lb 11.2 oz (85.1 kg)   10/29/18 187 lb (84.8 kg)   12/05/17 175 lb (79.4 kg)       Gen: Alert, no apparent distress.  Heart: Regular rate and rhythm, no murmurs.  Lungs: Clear to auscultation bilaterally, no increased work of breathing.  Abdomen: Soft, non-tender, non-distended, bowel sounds normal.  Extremities: No clubbing, cyanosis, edema.    Results for orders placed or performed in visit on 09/24/20   Glycosylated Hemoglobin A1c   Result Value Ref Range    Hemoglobin A1c 6.0 (H) <=5.6 %   Comprehensive Metabolic Panel   Result Value Ref Range    Sodium 136 136 - 145 mmol/L    Potassium 4.4 3.5 - 5.0 mmol/L    Chloride 102 98 - 107 mmol/L    CO2 26 22 - 31 mmol/L    Anion Gap, Calculation 8 5 - 18 mmol/L    Glucose 78 70 - 125 mg/dL    BUN 9 8 - 22 mg/dL    Creatinine 0.82 0.60 - 1.10 mg/dL    GFR MDRD Af Amer >60 >60 mL/min/1.73m2    GFR MDRD Non Af Amer >60 >60 mL/min/1.73m2    Bilirubin, Total 0.5 0.0 - 1.0 mg/dL    Calcium 9.6 8.5 - 10.5 mg/dL    Protein, Total 7.0 6.0 - 8.0  "g/dL    Albumin 4.4 3.5 - 5.0 g/dL    Alkaline Phosphatase 33 (L) 45 - 120 U/L    AST 18 0 - 40 U/L    ALT 14 0 - 45 U/L   Microalbumin, Random Urine   Result Value Ref Range    Microalbumin, Random Urine <0.50 0.00 - 1.99 mg/dL    Creatinine, Urine 104.8 mg/dL    Microalbumin/Creatinine Ratio Random Urine       No results found.    Assessment & Plan     1. DM2, well controlled. Refilled meds. Labs obtained. Will get recent eye exam notes.  2. Colon cancer screening. Colonoscopy ordered.  3. Immunizations updated.      Order Summary                                                      1. Type 2 diabetes mellitus with microalbuminuria, without long-term current use of insulin (H)  blood glucose meter (GLUCOMETER)    blood glucose test strips    Glycosylated Hemoglobin A1c    Comprehensive Metabolic Panel    Microalbumin, Random Urine    lisinopriL (PRINIVIL,ZESTRIL) 5 MG tablet    semaglutide (OZEMPIC) 0.25 mg or 0.5 mg(2 mg/1.5 mL) PnIj    simvastatin (ZOCOR) 20 MG tablet   2. Colon cancer screening  Ambulatory referral for Colonoscopy   3. Immunization due  Influenza, Recombinant, Inj, Quadrivalent, PF, 18+YRS    Varicella Zoster, Recombinant Vaccine IM    Tdap vaccine greater than or equal to 6yo IM   4. Panic Disorder With Agoraphobia  LORazepam (ATIVAN) 1 MG tablet   5. Generalized anxiety disorder  LORazepam (ATIVAN) 1 MG tablet    venlafaxine (EFFEXOR-XR) 75 MG 24 hr capsule   6. Diabetes mellitus (H)  pen needle, diabetic (BD ULTRA-FINE RORY PEN NEEDLE) 32 gauge x 5/32\" Ndle   7. Hyperlipidemia, unspecified hyperlipidemia type  simvastatin (ZOCOR) 20 MG tablet   8. Herpes simplex virus (HSV) infection  valACYclovir (VALTREX) 1000 MG tablet      No future appointments.    Completed by: Tara Elliott M.D., [facility]. 9/28/2020 10:33 AM.  This transcription uses voice recognition software, which may contain typographical errors.  "

## 2021-06-13 NOTE — TELEPHONE ENCOUNTER
"Who is calling:  The patient   Reason for Call:  The patient states was in the ER at [facility] in [place] on 11/8/20 for righted sided lower back pain. The patient states she was given valium for the spasms which has helped the spasms but not the pain. The patient states she is taking Motrin 800MG every six hours which she states is giving her a \" gut ache.\" The patient would like Tara Elliott MD to look at the evisit.  Date of last appointment with primary care:   Okay to leave a detailed message: Yes  [phone]      "

## 2021-06-13 NOTE — TELEPHONE ENCOUNTER
Called [facility] for insurance info    [employer]     BIN [id number]  LUIS CARLOS NVT  ID# [id number]  [facility]

## 2021-06-13 NOTE — TELEPHONE ENCOUNTER
Central PA team  [phone]  Pool: HE PA MED ([id number])          PA has been initiated.       PA form completed and faxed insurance via Cover My Meds     Key:  [id number]     Medication:  Lidocaine patches     Insurance:  [employer]        Response will be received via fax and may take up to 5-10 business days depending on plan

## 2021-06-13 NOTE — TELEPHONE ENCOUNTER
"  Called pt, left voice mail for patient to call clinic back #1    Will try again later. \" Okay to relay message\"    "

## 2021-06-13 NOTE — PROGRESS NOTES
Assessment/ Plan  1. Acute right-sided low back pain without sciatica  No red flags.Renewed additional Dilaudid for severe pain, in general use ibuprofen, she was having some stomach discomfort with this and will keep an eye on that, she is already on PPI.  Recommend remaining physically active/hold off on physical therapy for now work/ activity restrictions provided? No she is to go back to work on Monday as planned unless she is unable in which case she will contact  Follow-up:  as needed discussed 2 to 3 weeks from now, definitely doing physical therapy if she is not improving.  Discussed 4 weeks from now if problems persist, referral to spine clinic    - lidocaine (LIDODERM) 5 %; Apply to skin daily, leave in place for 12-18 hour and remove.  Repeat daily  Dispense: 30 patch; Refill: 11  - HYDROmorphone (DILAUDID) 2 MG tablet; Take 1 tablet (2 mg total) by mouth every 6 (six) hours as needed for pain.  Dispense: 20 tablet; Refill: 0    Body mass index is 25.79 kg/m .    Subjective  CC:  Chief Complaint   Patient presents with     Hospital Visit Follow Up     [facility] and [facility], still unable to feel her right thigh      Medication Refill     would like a refill on the Hydromorphone or something else for pain     HPI:  Patient here to follow-up on back pain.  2 emergency room visits, 11/9 at [facility], 11/10 at [facility].  I carefully reviewed [facility] notes.      Patient reports that she has had back pain for a month or 2, no leg symptoms.  Pain fairly mild.  Attributes it to walking the dogs and being very active.    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  Currently bent over to  a paper towel and was unable to get up off the floor.  Needed to call to get help.    Since that time pain has been severe though it is a little bit better over the last couple of days.  Pain in the right sacroiliac region, wrapping around to the  anterior thigh.  Some numbness.  Mostly in the thigh.  No weakness noted.  She denies fever, chills.    She was diaphoretic and they were worried about her in the [place] emergency room.  Abdominal CT done which was negative.  Treated with Valium.    Subsequently in the emergency room at [facility], MRI done.  This showedIMPRESSION:   1.  Multilevel degenerative disc disease of the lumbar spine with symmetric disc bulges without significant spinal canal narrowing at any level.  2.  Multilevel posterolateral disc disease and facet arthropathy with multilevel neural foraminal narrowing, most pronounced at the L4/L5 level where there is moderate bilateral neural foraminal narrowing.    She was given IV Dilaudid and prescribed oral Dilaudid  Patient Active Problem List   Diagnosis     Overweight (BMI 25.0-29.9)     Carpal tunnel syndrome     Panic Disorder With Agoraphobia     Type 2 diabetes mellitus with microalbuminuria (H)     Hyperlipidemia     Generalized anxiety disorder     Dysplastic Nevus     Actinic keratosis     Herpes simplex without mention of complication     Current medications reviewed as follows:  Current Outpatient Medications on File Prior to Visit   Medication Sig     blood glucose test strips Use 1 each As Directed daily. Dispense brand per patient's insurance at pharmacy discretion.     HYDROmorphone (DILAUDID) 2 MG tablet Take 1 tablet (2 mg total) by mouth every 6 (six) hours as needed (severe pain).     lidocaine (LIDODERM) 5 % Remove & Discard patch within 12 hours or as directed by MD     lisinopriL (PRINIVIL,ZESTRIL) 5 MG tablet Take 1 tablet (5 mg total) by mouth daily.     LORazepam (ATIVAN) 1 MG tablet Take 1 tablet (1 mg total) by mouth every 8 (eight) hours as needed for anxiety.     metFORMIN (GLUCOPHAGE) 1000 MG tablet Take 1 tablet (1,000 mg total) by mouth 2 (two) times a day with meals.     semaglutide (OZEMPIC) 0.25 mg or 0.5 mg(2 mg/1.5 mL) PnIj Inject 0.5 mg under the skin once  "a week.     simvastatin (ZOCOR) 20 MG tablet Take 1 tablet (20 mg total) by mouth at bedtime.     venlafaxine (EFFEXOR-XR) 75 MG 24 hr capsule Take 1 capsule (75 mg total) by mouth daily.     pen needle, diabetic (BD ULTRA-FINE RORY PEN NEEDLE) 32 gauge x 5/32\" Ndle 100     valACYclovir (VALTREX) 1000 MG tablet Take 1 tablet (1,000 mg total) by mouth 2 (two) times a day for 1 day. Repeat as needed for future outbreaks.     No current facility-administered medications on file prior to visit.      Social History     Tobacco Use   Smoking Status Never Smoker   Smokeless Tobacco Never Used     Social History     Social History Narrative     Not on file     Patient Care Team:  Tara Elliott MD as PCP - General  Tara Elliott MD as Assigned PCP  ROS  As above      Objective  Physical Exam  Vitals:    11/12/20 1112   BP: 108/67   Patient Site: Right Arm   Patient Position: Sitting   Cuff Size: Adult Regular   Pulse: 80   Resp: 16   Temp: 97.6  F (36.4  C)   TempSrc: Tympanic   Weight: 155 lb (70.3 kg)     Back examined,   Grossly noscoliosis  no obvious abnormality on inspection    Mild right-sided sacroiliac tenderness.  Fairly good range of motion, getting up and down out of chair a little bit gingerly   Patient able to walk on toes and heels and step up on step (knee extension) without difficulty.    Reflexes intact and symmetric.    Diagnostics  None    Please note: Voice recognition software was used in this dictation.  It may therefore contain typographical errors.    "

## 2021-06-13 NOTE — TELEPHONE ENCOUNTER
Who is calling:  Patient  Reason for Call:  Patient spoke to Dr. Mcintyre-and Dr. Mcintyre stated she could take her on as a patient to establish care.  Contact center is unable to schedule correctly as an establish care visit. Please call patient  Date of last appointment with primary care: n/a  Okay to leave a detailed message: Yes

## 2021-06-13 NOTE — PATIENT INSTRUCTIONS - HE
Based on the information provided, I believe you need to be seen immediately. It looks like you are currently in the ER. You will not be charged for this eVisit.

## 2021-06-13 NOTE — TELEPHONE ENCOUNTER
I called and left message to help get her scheduled as Dr. Mcintyre confirmed she would take her on a patient. Please assist in scheduling when she calls back.     Aydee Hall NICHOLAS

## 2021-06-13 NOTE — TELEPHONE ENCOUNTER
"Called pt, left voice mail for patient to call clinic back #1    Will try again later. \" Okay to relay message  "

## 2021-06-13 NOTE — PROGRESS NOTES
Provider E-Visit time total (minutes): <5    Question: Before we get started...   Answer:         Question: If you are charged for this eVisit, would you like us to bill your insurance or charge the credit card you authorized for the full amount?   Answer:         Question: Where are you having pain   Answer:   Lower Back      Question: Does the pain extend into your legs?   Answer:   Yes, into my right leg      Question: How bad is the pain?   Answer:   The pain is severe      Question: Did you have an injury that caused the pain?   Answer:   No, I cannot remember an injury      Question: How long has the pain been present?   Answer:   More than 2 days but less than 1 week      Question: Have you had back pain in the past?   Answer:   I have had back pain before, but this is markedly different      Question: Please list any medications you have previously taken for back pain.   Answer:   Flexeril- for back spasms. I ve never had back pain like this before.      Question: Do you have a fever?   Answer:   No, I do not have a fever      Question: Do you have any of the following?   Answer:   Areas that are numb or have a strange sensation      Question: What makes the pain worse?   Answer:   Any movement      Question: What makes the pain better?   Answer:   Nothing makes it better      Question: Have you ever been diagnosed with cancer?   Answer:   No      Question: Have you ever been diagnosed with arthritis?   Answer:   No      Question: Have you ever been diagnosed with osteoporosis or any other bone weakness?   Answer:   No      Question: Have you ever had surgery on your back or spine?   Answer:   No      Question: What is your usual health status?   Answer:   I am active and can move normally      Question: Wrap up   Answer:         Question: Anything else you would like to add?   Answer:   Normally I can get back spasms once a year, for which I rest, use ice/ heat and flexeril. It only last a day or so. This  is different, never had pain and numbness. The pain gets so bad when I get up to move that I feel like I could pass out. Which is what almost happened Sunday night in the ER. You have my permission to look at my records from that visit, if need be. I think I just waited to long to seek treatment, because we have been so busy at work.      Question: Are you pregnant or breastfeeding?   Answer:   I am confident that I am neither

## 2021-06-14 NOTE — PROGRESS NOTES
" Subjective    Irene Terrazas is a 47 y.o. female who presents for diabetes follow-up.    The patient reports that her blood sugars have been climbing a little bit.  She has gotten a little bit lax on her diet and exercise due to recent shift work, which includes working nights.  Weight has gone up as have sugars.  She is currently taking metformin for her diabetes.  She had stopped the glipizide because her A1c was quite low.  Wondering if she needs to restart the glipizide.  No symptoms of hyper or hypoglycemia.  She will schedule an eye exam with her primary ophthalmologist in the near future.  Due to insurance enrollment issues, she has to wait until after January.    She has recently had some cold sores and would like some Valtrex.  She would like her medications refilled.     Objective    Blood pressure 114/80, pulse 84, height 5' 5\" (1.651 m), weight 175 lb (79.4 kg), last menstrual period 01/01/2012, not currently breastfeeding. Body mass index is 29.12 kg/(m^2). Patient reports that she has never smoked. She does not have any smokeless tobacco history on file.    Gen: Alert, no apparent distress.  Heart: Regular rate and rhythm, no murmurs.  Lungs: Clear to auscultation bilaterally, no increased work of breathing.  Abdomen: Soft, non-tender, non-distended, bowel sounds normal.  Extremities: No clubbing, cyanosis, edema.    Results for orders placed or performed in visit on 12/05/17   Comprehensive Metabolic Panel   Result Value Ref Range    Sodium 138 136 - 145 mmol/L    Potassium 4.3 3.5 - 5.0 mmol/L    Chloride 102 98 - 107 mmol/L    CO2 29 22 - 31 mmol/L    Anion Gap, Calculation 7 5 - 18 mmol/L    Glucose 126 (H) 70 - 125 mg/dL    BUN 8 8 - 22 mg/dL    Creatinine 0.90 0.60 - 1.10 mg/dL    GFR MDRD Af Amer >60 >60 mL/min/1.73m2    GFR MDRD Non Af Amer >60 >60 mL/min/1.73m2    Bilirubin, Total 0.5 0.0 - 1.0 mg/dL    Calcium 9.9 8.5 - 10.5 mg/dL    Protein, Total 7.2 6.0 - 8.0 g/dL    Albumin 4.0 3.5 - " 5.0 g/dL    Alkaline Phosphatase 43 (L) 45 - 120 U/L    AST 21 0 - 40 U/L    ALT 22 0 - 45 U/L   HM2(CBC w/o Differential)   Result Value Ref Range    WBC 6.9 4.0 - 11.0 thou/uL    RBC 4.24 3.80 - 5.40 mill/uL    Hemoglobin 12.7 12.0 - 16.0 g/dL    Hematocrit 38.6 35.0 - 47.0 %    MCV 91 80 - 100 fL    MCH 29.9 27.0 - 34.0 pg    MCHC 32.9 32.0 - 36.0 g/dL    RDW 11.2 11.0 - 14.5 %    Platelets 359 140 - 440 thou/uL    MPV 7.4 7.0 - 10.0 fL   Lipid Cascade   Result Value Ref Range    Cholesterol 168 <=199 mg/dL    Triglycerides 80 <=149 mg/dL    HDL Cholesterol 54 >=50 mg/dL    LDL Calculated 98 <=129 mg/dL    Patient Fasting > 8hrs? Yes    Thyroid Cascade   Result Value Ref Range    TSH 2.16 0.30 - 5.00 uIU/mL     No results found.       Assessment & Plan      Irene is a 47 y.o. female who is here today for Diabetes (needing labs done and medication refills. Would like to start valtrex for cold sores. )      1. Type 2 diabetes, now uncontrolled.  Resume glipizide 5 mg daily.  Recheck in 3 months.  2. Medications refilled for her other stable, chronic health conditions including generalized anxiety disorder, hyperlipidemia, and cold sores.    1. Diabetic nephropathy associated with type 2 diabetes mellitus  lisinopril (PRINIVIL,ZESTRIL) 5 MG tablet    HM2(CBC w/o Differential)    CANCELED: Microalbumin, Random Urine   2. Generalized anxiety disorder  venlafaxine (EFFEXOR-XR) 75 MG 24 hr capsule    LORazepam (ATIVAN) 1 MG tablet   3. Herpes simplex virus (HSV) infection  valACYclovir (VALTREX) 1000 MG tablet   4. Hyperlipidemia  simvastatin (ZOCOR) 20 MG tablet    Lipid Cascade   5. Type 2 diabetes mellitus  simvastatin (ZOCOR) 20 MG tablet    metFORMIN (GLUCOPHAGE) 1000 MG tablet    Comprehensive Metabolic Panel    Thyroid Cascade    Glycosylated Hemoglobin A1c    glipiZIDE (GLUCOTROL) 5 MG tablet    CANCELED: Glycosylated Hemoglobin A1c   6. Panic Disorder With Agoraphobia  LORazepam (ATIVAN) 1 MG tablet        No future appointments.     This transcription uses voice recognition software, which may contain typographical errors.

## 2021-06-14 NOTE — TELEPHONE ENCOUNTER
Pt called back letting us know that she is still very much interested in having Dr. Mcintyre as her PCP. She is getting some cardiac concerns addressed that Dr. Elliott her previous provider had been seeing her for and would like to continue to see until the testing is resolved but would like to call back to est care with Dr. Mcintyre when this is complete.     Aydee Hall, A

## 2021-06-16 NOTE — TELEPHONE ENCOUNTER
Dr. Elliott, Medication refill requested. Please authorize medication if appropriate. Thank you.

## 2021-06-16 NOTE — TELEPHONE ENCOUNTER
Telephone Encounter by Tara Elliott MD at 9/13/2019  5:34 PM     Author: Tara Elliott MD Service: -- Author Type: Physician    Filed: 9/13/2019  5:35 PM Encounter Date: 9/12/2019 Status: Signed    : Tara Elliott MD (Physician)       Rx refilled  Due for visit before next refill. Please schedule physical in October.  Health Maintenance Due   Topic Date Due   ? PREVENTIVE CARE VISIT  1970   ? HIV SCREENING  05/19/1985   ? DIABETES OPHTHALMOLOGY EXAM  01/04/2019   ? DIABETES FOLLOW-UP  04/29/2019   ? INFLUENZA VACCINE RULE BASED (1) 08/01/2019   ? DIABETES HEMOGLOBIN A1C  09/11/2019         Last Visit  10/29/2018 Tara Elliott MD  No future appointments.    Controlled Substance Agreement  On file (<1 year ago).    Last Drug Screen  Lab Results   Component Value Date    AMPHET Screen Negative 10/29/2018    HEBENZODIAZ Screen Negative 10/29/2018    PCP Screen Negative 10/29/2018    THC Screen Negative 10/29/2018    BARBIT Screen Negative 10/29/2018    COCAINEMETAB Screen Negative 10/29/2018    OPIATES Screen Negative 10/29/2018    CREAUR 154.1 10/29/2018        [place]  Review

## 2021-06-16 NOTE — TELEPHONE ENCOUNTER
Controlled Substance Refill Request  Medication Name:   Requested Prescriptions     Pending Prescriptions Disp Refills     LORazepam (ATIVAN) 1 MG tablet [Pharmacy Med Name: LORAZEPAM 1 MG TABLET] 30 tablet 0     Sig: TAKE 1 TABLET (1 MG TOTAL) BY MOUTH EVERY 8 (EIGHT) HOURS AS NEEDED FOR ANXIETY.     Date Last Fill: 12/30/20  Requested Pharmacy: [facility]  Submit electronically to pharmacy  Controlled Substance Agreement on file:   Encounter-Level CSA Scan Date - 10/29/2018:    Scan on 11/1/2018 10:16 AM        Last office visit:  11/12/20

## 2021-06-17 NOTE — TELEPHONE ENCOUNTER
Telephone Encounter by Chacha Estevez at 11/19/2020 10:12 AM     Author: Chacha Estevez Service: -- Author Type: --    Filed: 11/19/2020 10:14 AM Encounter Date: 11/17/2020 Status: Signed    : Chacha Estevez       PRIOR AUTHORIZATION DENIED    Denial Rational:  Drug is not being used to treat postherpetic neuralgia or diabetic neuropathy         Appeal Information: This medication was denied. If physician would like to appeal because patient has contraindication or allergy to covered medication please write letter of medical necessity and route back to PA team to initiate.  If no further action is needed please close encounter thank you.

## 2021-06-20 ENCOUNTER — COMMUNICATION - HEALTHEAST (OUTPATIENT)
Dept: FAMILY MEDICINE | Facility: CLINIC | Age: 51
End: 2021-06-20

## 2021-06-20 DIAGNOSIS — E83.42 HYPOMAGNESEMIA: ICD-10-CM

## 2021-06-20 NOTE — LETTER
Letter by Tara Elliott MD at      Author: Tara Elliott MD Service: -- Author Type: --    Filed:  Encounter Date: 6/19/2020 Status: (Other)         [facility] FAMILY MEDICINE/OB  06/19/20    Patient: Irene Terrazas  YOB: 1970  Medical Record Number: [id number]  CSN: [id number]                                                                              Non-opioid Controlled Substance Agreement    I understand that my care provider has prescribed a controlled substance to help manage my condition(s). I am taking this medicine to help me function or work. I know this is strong medicine, and that it can cause serious side effects. Controlled substances can be sedating, addicting and may cause a dependency on the drug. They can affect my ability to drive or think, and cause depression. They need to be taken exactly as prescribed. Combining controlled substances with certain medicines or chemicals (such as cocaine, sedatives and tranquilizers, sleeping pills, meth) can be dangerous or even fatal. Also, if I stop controlled substances suddenly, I may have severe withdrawal symptoms.  If not helpful, I may be asked to stop them.    The risks, benefits, and side effects of these medicine(s) were explained to me. I agree that:    1. I will take part in other treatments as advised by my care team. This may be psychiatry or counseling, physical therapy, behavioral therapy, group treatment or a referral to a pain clinic. I will reduce or stop my medicine when my care team tells me to do so.  2. I will take my medicines as prescribed. I will not change the dose or schedule unless my care team tells me to. There will be no refills if I run out early.  I may be contactedwithout warning and asked to complete a urine drug test or pill count at any time.   3. I will keep all my appointments, and understand this is part of the monitoring of controlled substances. My care team may require an office visit  for EVERY controlled substance refill. If I miss appointments or dont follow instructions, my care team may stop my medicine.  4. I will not ask other providers to prescribe controlled substances, and I will not accept controlled substances from other people. If I need another prescribed controlled substance for a new reason, I will tell my care team within 1 business day.  5. I will use one pharmacy to fill all of my controlled substance prescriptions, and it is up to me to make sure that I do not run out of my medicines on weekends or holidays. If my care team is willing to refill my controlled substance prescription without a visit, I must request refills only during office hours, refills may take up to 3 days to process, and it may take up to 5 to 7 days for my medicine to be mailed and ready at my pharmacy. Prescriptions will not be mailed anywhere except my pharmacy.    6. I am responsible for my prescriptions. If the medicine/prescription is lost or stolen, it will not be replaced. I also agree not to share controlled substance medicines with anyone.          [facility] FAMILY MEDICINE/OB  06/19/20  Patient:  Irene Terrazas  YOB: 1970  Medical Record Number: [id number]  CSN: [id number]    7. I agree to not use ANY illegal or recreational drugs. This includes marijuana, cocaine, bath salts or other drugs. I agree not to use alcohol unless my care team says I may. I agree to give urine samples whenever asked. If I dont give a urine sample, the care team may stop my medicine.    8. If I enroll in the [place] Medical Marijuana program, I will tell my care team. I will also sign an agreement to share my medical records with my care team.    9. I will bring in my list of medicines (or my medicine bottles) each time I come to the clinic.   10. I will tell my care team right away if I become pregnant or have a new medical problem treated outside of my regular clinic.  11. I understand that  this medicine can affect my thinking and judgment. It may be unsafe for me to drive, use machinery and do dangerous tasks. I will not do any of these things until I know how the medicine affects me. If my dose changes, I will wait to see how it affects me. I will contact my care team if I have concerns about medicine side effects.    I understand that if I do not follow any of the conditions above, my prescriptions or treatment may be stopped.      I agree that my provider, clinic care team, and pharmacy may work with any city, state or federal law enforcement agency that investigates the misuse, sale, or other diversion of my controlled medicine. I will allow my provider to discuss my care with or share a copy of this agreement with any other treating provider, pharmacy or emergency room where I receive care. I agree to give up (waive) any right of privacy or confidentiality with respect to these consents.   I have read this agreement and have asked questions about anything I did not understand.    ___________________________________________________________________________  Patient signature - Date/Time  -Irene Terrazas                                      ___________________________________________________________________________  Witness signature                                                                    ___________________________________________________________________________  Provider signature- Tara Elliott MD

## 2021-06-20 NOTE — LETTER
Letter by Tara Elliott MD at      Author: Tara Elliott MD Service: -- Author Type: --    Filed:  Encounter Date: 2/21/2020 Status: (Other)         Irene Terrazas  [address] [facility] [address]             February 27, 2020         Dear Ms. Terrazas,    Below are the results from your recent visit:    The EMG showed tunnel syndrome on both sides, moderate severity.  I would recommend seeing an orthopedic surgeon for discussion of treatment due to the demands of your job.    Please call with questions or contact us using [employer].    Sincerely,        Electronically signed by Tara Elliott MD

## 2021-06-21 NOTE — PROGRESS NOTES
" Subjective    Irene Terrazas is a 48 y.o. female who presents for follow-up of diabetes.    The patient reports that she has been experiencing hyperglycemia recently.  This summer was hard for her.  Her daughters are now both in college.  She has a very close relationship with them.  It was hard to make the transition for her.  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  Now, she is working more and working overnights a lot as well.  When she had his symptoms of fatigue, 1 of the diabetes educator suggested checking her sugar.  When she checked them, she was surprised to find that her sugars were higher than they have been.  She has been struggling with her weight, but does not feel like she is significantly changed her intake.  She thinks that she may be experiencing menopause.  She had a hysterectomy in 2012 so she is not sure.  She does have her ovaries.  She would be interested in 1 of the newer diabetes medications to help . 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  She denies symptoms of depression.  She has chronic anxiety.  It is well managed with rare lorazepam use.  She is okay with doing a controlled substance agreement and drug screen today for medication management.  I have had no concerns about abuse or diversion.     Objective    Blood pressure 116/76, pulse 74, temperature 98.3  F (36.8  C), temperature source Oral, resp. rate 18, height 5' 5\" (1.651 m), weight 187 lb (84.8 kg), last menstrual period 01/01/2012, SpO2 98 %, not currently breastfeeding. Body mass index is 31.12 kg/(m^2). Patient reports that she has never smoked. She has never used smokeless tobacco.    Gen: Alert, no apparent distress.  Heart: Regular rate and rhythm, no murmurs.  Lungs: Clear to auscultation bilaterally, no increased work of breathing.  Abdomen: Soft, non-tender, non-distended, bowel sounds normal.  Extremities: No clubbing, cyanosis, " edema.    Results for orders placed or performed in visit on 10/29/18   Glycosylated Hemoglobin A1c   Result Value Ref Range    Hemoglobin A1c 7.7 (H) 3.5 - 6.0 %   Comprehensive Metabolic Panel   Result Value Ref Range    Sodium 135 (L) 136 - 145 mmol/L    Potassium 4.7 3.5 - 5.0 mmol/L    Chloride 102 98 - 107 mmol/L    CO2 24 22 - 31 mmol/L    Anion Gap, Calculation 9 5 - 18 mmol/L    Glucose 142 (H) 70 - 125 mg/dL    BUN 16 8 - 22 mg/dL    Creatinine 0.94 0.60 - 1.10 mg/dL    GFR MDRD Af Amer >60 >60 mL/min/1.73m2    GFR MDRD Non Af Amer >60 >60 mL/min/1.73m2    Bilirubin, Total 0.4 0.0 - 1.0 mg/dL    Calcium 9.7 8.5 - 10.5 mg/dL    Protein, Total 7.0 6.0 - 8.0 g/dL    Albumin 4.0 3.5 - 5.0 g/dL    Alkaline Phosphatase 43 (L) 45 - 120 U/L    AST 19 0 - 40 U/L    ALT 26 0 - 45 U/L   Lipid Cascade   Result Value Ref Range    Cholesterol 160 <=199 mg/dL    Triglycerides 93 <=149 mg/dL    HDL Cholesterol 45 (L) >=50 mg/dL    LDL Calculated 96 <=129 mg/dL    Patient Fasting > 8hrs? Yes    Thyroid Cascade   Result Value Ref Range    TSH 1.46 0.30 - 5.00 uIU/mL   Microalbumin, Random Urine   Result Value Ref Range    Microalbumin, Random Urine <0.50 0.00 - 1.99 mg/dL    Creatinine, Urine 148.5 mg/dL    Microalbumin/Creatinine Ratio Random Urine  <=19.9 mg/g   Drugs of Abuse 1,Urine   Result Value Ref Range    Amphetamines Screen Negative Screen Negative    Benzodiazepines Screen Negative Screen Negative    Opiates Screen Negative Screen Negative    Phencyclidine Screen Negative Screen Negative    THC Screen Negative Screen Negative    Barbiturates Screen Negative Screen Negative    Cocaine Metabolite Screen Negative Screen Negative    Oxycodone Screen Negative Screen Negative    Creatinine, Urine 154.1 mg/dL     No results found.       Assessment & Plan      Irene is a 48 y.o. female who is here today for Diabetes      1. Type 2 diabetes, uncontrolled with history of diabetic nephropathy.  Patient highly likely to  be compliant with medications.  Good healthcare literacy.  Referral made to diabetes education to identify which of the newer diabetes medications would be covered by insurance and helpful for lowering sugars as well as improving weight.  2. Overweight.  Patient motivated to change.  Referral made to weight loss clinic in [place].  3. Generalized anxiety disorder, well controlled with occasional as needed lorazepam.  Controlled substance agreement and drug screen done today.  No concerns about abuse or diversion.    1. Type 2 diabetes mellitus (H)  Glycosylated Hemoglobin A1c    Comprehensive Metabolic Panel    Thyroid Cascade    Microalbumin, Random Urine    Ambulatory referral to Diabetes Education Program (Existing Diagnosis)   2. Hyperlipidemia  Lipid Cascade   3. Overweight (BMI 25.0-29.9)  Ambulatory referral for Weight Management: [place]   4. Need for immunization against influenza  Influenza, Seasonal,Quad Inj, 36+ MOS   5. Generalized anxiety disorder  Drugs of Abuse 1,Urine       Future Appointments  Date Time Provider Department Center   11/20/2018 1:00 PM Pauline Vega RD, CDE [facility] DIABED [facility]   12/28/2018 9:00 AM [facility]        This transcription uses voice recognition software, which may contain typographical errors.

## 2021-06-26 ENCOUNTER — HEALTH MAINTENANCE LETTER (OUTPATIENT)
Age: 51
End: 2021-06-26

## 2021-06-26 NOTE — TELEPHONE ENCOUNTER
RN cannot approve Refill Request    RN can NOT refill this medication Protocol failed and NO refill given. Last office visit: 9/24/2020 Tara Elliott MD Last Physical: Visit date not found Last MTM visit: Visit date not found Last visit same specialty: 11/12/2020 Wilian Marrufo MD.  Next visit within 3 mo: Visit date not found  Next physical within 3 mo: Visit date not found      Brea Gonzales, [facility] Triage/Med Refill 6/20/2021    Requested Prescriptions   Pending Prescriptions Disp Refills     SLOW-MAG 71.5 mg TbEC [Pharmacy Med Name: SLOW-MAG 71.5 MG TABLET] 90 tablet 2     Sig: TAKE 1 TABLET BY MOUTH EVERY DAY       There is no refill protocol information for this order

## 2021-06-29 ENCOUNTER — COMMUNICATION - HEALTHEAST (OUTPATIENT)
Dept: FAMILY MEDICINE | Facility: CLINIC | Age: 51
End: 2021-06-29

## 2021-06-29 DIAGNOSIS — F41.1 GENERALIZED ANXIETY DISORDER: ICD-10-CM

## 2021-06-29 DIAGNOSIS — F40.01 AGORAPHOBIA WITH PANIC DISORDER: ICD-10-CM

## 2021-06-29 RX ORDER — LORAZEPAM 1 MG/1
1 TABLET ORAL EVERY 8 HOURS PRN
Qty: 30 TABLET | Refills: 0 | Status: SHIPPED | OUTPATIENT
Start: 2021-06-29 | End: 2021-07-16

## 2021-07-04 NOTE — TELEPHONE ENCOUNTER
Telephone Encounter by Linda Elias at 6/29/2021  3:38 PM     Author: Linda Elias Service: -- Author Type: --    Filed: 6/29/2021  3:38 PM Encounter Date: 6/29/2021 Status: Signed    : Linda Elias       Patient has a future F2F appointment on 7/16/21 with Dr. Elliott. Completing task.

## 2021-07-04 NOTE — TELEPHONE ENCOUNTER
Telephone Encounter by Tara Elliott MD at 6/29/2021  3:19 PM     Author: Tara Elliott MD Service: -- Author Type: Physician    Filed: 6/29/2021  3:23 PM Encounter Date: 6/29/2021 Status: Signed    : Tara Elliott MD (Physician)       Due for controlled substance agreement, physical, diabetes check. Please schedule    Irene was seen today for medication refill.    Diagnoses and all orders for this visit:    Panic Disorder With Agoraphobia    Generalized anxiety disorder         Last Office Visit 9/24/2020 Tara Elliott MD  Last Physical (in last year) Visit date not found   Future Appointments   Date Time Provider Department Center   7/16/2021  7:40 AM Tara Elliott MD [facility]       Controlled Substance Agreement  On file (>1 year ago).    Last Drug Screen  Lab Results   Component Value Date    AMPHET Screen Negative 10/29/2018    HEBENZODIAZ Screen Negative 10/29/2018    PCP Screen Negative 10/29/2018    THC Screen Negative 10/29/2018    BARBIT Screen Negative 10/29/2018    COCAINEMETAB Screen Negative 10/29/2018    OPIATES Screen Negative 10/29/2018    CREAUR 104.8 09/24/2020        [place]  Review

## 2021-07-04 NOTE — TELEPHONE ENCOUNTER
Controlled Substance Refill Request  Medication Name:   Requested Prescriptions     Pending Prescriptions Disp Refills     LORazepam (ATIVAN) 1 MG tablet 30 tablet 0     Sig: Take 1 tablet (1 mg total) by mouth every 8 (eight) hours as needed for anxiety.     Date Last Fill: 4/22/2021  Requested Pharmacy: [facility]  Submit electronically to pharmacy  Controlled Substance Agreement on file:   Encounter-Level CSA Scan Date - 10/29/2018:    Scan on 11/1/2018 10:16 AM        Last office visit:  11/12/2020    Fabiola Jacob RN 06/29/21 12:58 PM  [facility] [place] Nurse Advisor

## 2021-07-05 PROBLEM — M62.830 BACK MUSCLE SPASM: Status: ACTIVE | Noted: 2020-11-10

## 2021-07-16 ENCOUNTER — OFFICE VISIT (OUTPATIENT)
Dept: FAMILY MEDICINE | Facility: CLINIC | Age: 51
End: 2021-07-16
Payer: COMMERCIAL

## 2021-07-16 VITALS
HEART RATE: 76 BPM | BODY MASS INDEX: 24.66 KG/M2 | RESPIRATION RATE: 15 BRPM | HEIGHT: 65 IN | WEIGHT: 148 LBS | SYSTOLIC BLOOD PRESSURE: 100 MMHG | DIASTOLIC BLOOD PRESSURE: 60 MMHG

## 2021-07-16 DIAGNOSIS — E11.29 TYPE 2 DIABETES MELLITUS WITH MICROALBUMINURIA, WITHOUT LONG-TERM CURRENT USE OF INSULIN (H): ICD-10-CM

## 2021-07-16 DIAGNOSIS — Z00.00 ROUTINE ADULT HEALTH MAINTENANCE: Primary | ICD-10-CM

## 2021-07-16 DIAGNOSIS — N91.2 AMENORRHEA: ICD-10-CM

## 2021-07-16 DIAGNOSIS — E78.5 HYPERLIPIDEMIA, UNSPECIFIED HYPERLIPIDEMIA TYPE: ICD-10-CM

## 2021-07-16 DIAGNOSIS — E83.42 HYPOMAGNESEMIA: ICD-10-CM

## 2021-07-16 DIAGNOSIS — R80.9 TYPE 2 DIABETES MELLITUS WITH MICROALBUMINURIA, WITHOUT LONG-TERM CURRENT USE OF INSULIN (H): ICD-10-CM

## 2021-07-16 LAB
ALBUMIN SERPL-MCNC: 4.1 G/DL (ref 3.5–5)
ALP SERPL-CCNC: 32 U/L (ref 45–120)
ALT SERPL W P-5'-P-CCNC: 15 U/L (ref 0–45)
ANION GAP SERPL CALCULATED.3IONS-SCNC: 10 MMOL/L (ref 5–18)
AST SERPL W P-5'-P-CCNC: 13 U/L (ref 0–40)
BILIRUB SERPL-MCNC: 0.7 MG/DL (ref 0–1)
BUN SERPL-MCNC: 7 MG/DL (ref 8–22)
CALCIUM SERPL-MCNC: 9.4 MG/DL (ref 8.5–10.5)
CHLORIDE BLD-SCNC: 102 MMOL/L (ref 98–107)
CO2 SERPL-SCNC: 26 MMOL/L (ref 22–31)
CREAT SERPL-MCNC: 0.78 MG/DL (ref 0.6–1.1)
FSH SERPL-ACNC: 14.4 MIU/ML
GFR SERPL CREATININE-BSD FRML MDRD: 88 ML/MIN/1.73M2
GLUCOSE BLD-MCNC: 81 MG/DL (ref 70–125)
MAGNESIUM SERPL-MCNC: 2.2 MG/DL (ref 1.8–2.6)
POTASSIUM BLD-SCNC: 4.6 MMOL/L (ref 3.5–5)
PROT SERPL-MCNC: 6.8 G/DL (ref 6–8)
SODIUM SERPL-SCNC: 138 MMOL/L (ref 136–145)

## 2021-07-16 PROCEDURE — 80053 COMPREHEN METABOLIC PANEL: CPT | Performed by: FAMILY MEDICINE

## 2021-07-16 PROCEDURE — 83735 ASSAY OF MAGNESIUM: CPT | Performed by: FAMILY MEDICINE

## 2021-07-16 PROCEDURE — 90750 HZV VACC RECOMBINANT IM: CPT | Performed by: FAMILY MEDICINE

## 2021-07-16 PROCEDURE — 90471 IMMUNIZATION ADMIN: CPT | Performed by: FAMILY MEDICINE

## 2021-07-16 PROCEDURE — 83001 ASSAY OF GONADOTROPIN (FSH): CPT | Performed by: FAMILY MEDICINE

## 2021-07-16 PROCEDURE — 99396 PREV VISIT EST AGE 40-64: CPT | Mod: 25 | Performed by: FAMILY MEDICINE

## 2021-07-16 PROCEDURE — 36415 COLL VENOUS BLD VENIPUNCTURE: CPT | Performed by: FAMILY MEDICINE

## 2021-07-16 SDOH — HEALTH STABILITY: PHYSICAL HEALTH: ON AVERAGE, HOW MANY DAYS PER WEEK DO YOU ENGAGE IN MODERATE TO STRENUOUS EXERCISE (LIKE A BRISK WALK)?: 7 DAYS

## 2021-07-16 SDOH — HEALTH STABILITY: PHYSICAL HEALTH: ON AVERAGE, HOW MANY MINUTES DO YOU ENGAGE IN EXERCISE AT THIS LEVEL?: 60 MIN

## 2021-07-16 ASSESSMENT — MIFFLIN-ST. JEOR: SCORE: 1287.2

## 2021-07-16 NOTE — PROGRESS NOTES
Health Maintenance Exam  [facility]  Date of Service: 2021    Subjective   Irene Terrazas is a 51 year old female who presents for a routine physical exam.     Current concerns include the following:    No concerns.    Patient Active Problem List    Diagnosis Date Noted     Hypomagnesemia 2020     Priority: Medium     Back muscle spasm 11/10/2020     Priority: Medium     Carpal tunnel syndrome      Priority: Medium     EMG : Bilateral median neuropathy at the wrist consistent with   entrapment in the carpal tunnel, moderate in severity.  Right equal to   left in severity.         Type 2 diabetes mellitus with microalbuminuria (H)      Priority: Medium     Dx .  Microalbuminuria .         Panic Disorder With Agoraphobia      Priority: Medium     Hyperlipidemia      Priority: Medium     Generalized anxiety disorder      Priority: Medium     Dysplastic Nevus      Priority: Medium     Back. Severely dysplastic, but not malignant. Biopsied by Dermatology   Consultants .  Sees derm for mole checks.         Actinic keratosis 2015     Priority: Medium     Sees dermatology yearly.         Herpes simplex without mention of complication 2015     Priority: Medium       History reviewed. No pertinent past medical history.   Past Surgical History:   Procedure Laterality Date     C  DELIVERY ONLY      Description:  Section;  Recorded: 10/27/2010;  Comments: x3     C LIGATE FALLOPIAN TUBE      Description: Tubal Ligation;  Recorded: 2007;     CYST REMOVAL Left 2016    Epidermal inclusion cyst removal - 3cm - left leg     CYST REMOVAL  2017    Epidermal inclusion cyst removal - 3.5cm - lower chest     HYSTERECTOMY TOTAL ABDOMINAL  2011    For benign indication.     [place] EXCISE EXCESS SKIN TISSUE,ABDOMEN, ADD-ON      Description: Abdominoplasty;  Proc Date: 2014;      Current Outpatient Medications   Medication Sig  "Dispense Refill     blood glucose test strips [BLOOD GLUCOSE TEST STRIPS] Use 1 each As Directed daily. Dispense brand per patient's insurance at pharmacy discretion. 100 strip 3     lisinopriL (PRINIVIL,ZESTRIL) 5 MG tablet [LISINOPRIL (PRINIVIL,ZESTRIL) 5 MG TABLET] Take 1 tablet (5 mg total) by mouth daily. 90 tablet 3     LORazepam (ATIVAN) 1 MG tablet [LORAZEPAM (ATIVAN) 1 MG TABLET] TAKE 1 TABLET (1 MG TOTAL) BY MOUTH EVERY 8 (EIGHT) HOURS AS NEEDED FOR ANXIETY. 30 tablet 0     metFORMIN (GLUCOPHAGE) 1000 MG tablet [METFORMIN (GLUCOPHAGE) 1000 MG TABLET] TAKE 1 TABLET BY MOUTH TWICE A DAY WITH MEALS 180 tablet 3     pen needle, diabetic (BD ULTRA-FINE RORY PEN NEEDLE) 32 gauge x 5/32\" Ndle [PEN NEEDLE, DIABETIC (BD ULTRA-FINE RORY PEN NEEDLE) 32 GAUGE X 5/32\" NDLE] 100 100 each 4     semaglutide (OZEMPIC) 0.25 mg or 0.5 mg(2 mg/1.5 mL) PnIj [SEMAGLUTIDE (OZEMPIC) 0.25 MG OR 0.5 MG(2 MG/1.5 ML) PNIJ] Inject 0.5 mg under the skin once a week. 3 Syringe 3     simvastatin (ZOCOR) 20 MG tablet [SIMVASTATIN (ZOCOR) 20 MG TABLET] Take 1 tablet (20 mg total) by mouth at bedtime. 90 tablet 3     valACYclovir (VALTREX) 1000 MG tablet [VALACYCLOVIR (VALTREX) 1000 MG TABLET] Take 1 tablet (1,000 mg total) by mouth 2 (two) times a day for 1 day. Repeat as needed for future outbreaks. 10 tablet 6     venlafaxine (EFFEXOR-XR) 75 MG 24 hr capsule [VENLAFAXINE (EFFEXOR-XR) 75 MG 24 HR CAPSULE] Take 1 capsule (75 mg total) by mouth daily. 90 capsule 3      No Known Allergies   Social History     Socioeconomic History     Marital status:      Spouse name: Anderson     Number of children: 3     Years of education: Not on file     Highest education level: Associate degree: occupational, technical, or vocational program   Occupational History     Occupation: Respiratory Therapist     Employer: [employer]   Tobacco Use     Smoking status: Never Smoker     Smokeless tobacco: Never Used   Vaping Use     Vaping Use: Never used " "  Substance and Sexual Activity     Alcohol use: Yes     Comment: Alcoholic Drinks/day: \"once a month\"     Drug use: Never     Sexual activity: Yes     Partners: Male     Birth control/protection: Surgical     Comment: Hysterectomy 2011   Other Topics Concern     Not on file   Social History Narrative     Not on file     Social Determinants of Health     Financial Resource Strain:      Difficulty of Paying Living Expenses:    Food Insecurity:      Worried About Running Out of Food in the Last Year:      Ran Out of Food in the Last Year:    Transportation Needs:      Lack of Transportation (Medical):      Lack of Transportation (Non-Medical):    Physical Activity: Sufficiently Active     Days of Exercise per Week: 7 days     Minutes of Exercise per Session: 60 min   Stress:      Feeling of Stress :    Social Connections:      Frequency of Communication with Friends and Family:      Frequency of Social Gatherings with Friends and Family:      Attends Jewish Services:      Active Member of Clubs or Organizations:      Attends Club or Organization Meetings:      Marital Status:    Intimate Partner Violence:      Fear of Current or Ex-Partner:      Emotionally Abused:      Physically Abused:      Sexually Abused:       Family History   Adopted: Yes   Family history unknown: Yes      REVIEW OF SYSTEMS: negative, except as listed in subjective above.    Physical Exam   /60 (BP Location: Left arm, Patient Position: Sitting, Cuff Size: Adult Regular)   Pulse 76   Resp 15   Ht 1.651 m (5' 5\")   Wt 67.1 kg (148 lb)   BMI 24.63 kg/m     History   Smoking Status     Never Smoker   Smokeless Tobacco     Never Used     General: Alert, NAD.  Head: NC/AT.  Ears: Normal canal, pinnae and tympanic membrane.  Eyes: PERRL, normal fundi.  Nose: Normal mucosa.  Mouth: Adequate dentition, normal throat.  Neck: normal thyroid, midline trachea.  Breasts: no masses, skin changes, nipple discharge or tenderness.  Lungs: CTA " bilaterally, no increased work of breathing.  Heart: RRR, no m/r/g  Abdomen: soft, nt/nd, BS+  Genitourinary: Normal vulva, normal vaginal mucosa, cervix normal appearance. No cervical or adnexal tenderness. No adnexal fullness.  Musculoskeletal: No significant deformity.  Skin: no atypical lesion or rash.  Lymphatics: no lymphadenopathy.   Psych: normal affect.    No visits with results within 1 Week(s) from this visit.   Latest known visit with results is:   Ambulatory - [facility] on 12/21/2020   Component Date Value Ref Range Status     Ventricular Rate 12/21/2020 83  BPM Final     Atrial Rate 12/21/2020 83  BPM Final     ID Interval 12/21/2020 132  ms Final     QRS Duration 12/21/2020 76  ms Final     QT 12/21/2020 368  ms Final     QTc 12/21/2020 432  ms Final     P Axis 12/21/2020 64  degrees Final     R AXIS 12/21/2020 -12  degrees Final     T Axis 12/21/2020 51  degrees Final     Interpretation ECG 12/21/2020    Final                    Value:Normal sinus rhythm  Normal ECG  No previous ECGs available  Confirmed by LESA LEVIN MD LOC: ([id number]) on 1/5/2021 4:02:08 PM           Assessment & Plan   1. Health Maintenance  o Cancer screening: Up to date.  o Bone Health: Discussed Calcium, vitamin D, and weight bearing exercise.  o Immunizations: Reviewed and Updated today.  2. Healthy weight. Discussed maintenance of healthy weight.   3. Reproductive plans: Not planning pregnancy. Contraception: hysterectomy. Check FSH to assess for menopause.  4. Advance directive: form given I have had an Advance Directives discussion with the patient..    Order Summary                                                      Routine adult health maintenance    Type 2 diabetes mellitus with microalbuminuria, without long-term current use of insulin (H)  -     Adult Eye Referral; Future  -     Hemoglobin A1c POCT; Future    Hyperlipidemia, unspecified hyperlipidemia type  -     Comprehensive metabolic panel (BMP + Alb, Alk Phos,  ALT, AST, Total. Bili, TP); Future  -     Comprehensive metabolic panel (BMP + Alb, Alk Phos, ALT, AST, Total. Bili, TP)    Hypomagnesemia  -     Magnesium; Future  -     Magnesium    Amenorrhea  -     Follicle stimulating hormone; Future  -     Follicle stimulating hormone    Other orders  -     REVIEW OF HEALTH MAINTENANCE PROTOCOL ORDERS  -     ZOSTER VACCINE RECOMBINANT ADJUVANTED IM NJX        No future appointments.    Completed by: Tara Elliott M.D., [facility]. 7/16/2021 7:48 AM.  This transcription uses voice recognition software, which may contain typographical errors.

## 2021-07-19 ENCOUNTER — MYC MEDICAL ADVICE (OUTPATIENT)
Dept: FAMILY MEDICINE | Facility: CLINIC | Age: 51
End: 2021-07-19

## 2021-07-19 DIAGNOSIS — M62.830 BACK MUSCLE SPASM: Primary | ICD-10-CM

## 2021-07-21 ENCOUNTER — RECORDS - HEALTHEAST (OUTPATIENT)
Dept: ADMINISTRATIVE | Facility: CLINIC | Age: 51
End: 2021-07-21

## 2021-08-05 ENCOUNTER — TRANSFERRED RECORDS (OUTPATIENT)
Dept: HEALTH INFORMATION MANAGEMENT | Facility: CLINIC | Age: 51
End: 2021-08-05

## 2021-08-13 RX ORDER — CYCLOBENZAPRINE HCL 5 MG
5 TABLET ORAL 3 TIMES DAILY PRN
Qty: 30 TABLET | Refills: 0 | Status: SHIPPED | OUTPATIENT
Start: 2021-08-13 | End: 2021-11-08

## 2021-08-13 NOTE — TELEPHONE ENCOUNTER
Reason for Call:  Medication or medication refill:    Do you use a [facility]?  Name of the pharmacy and phone number for the [facility] [address]    Name of the medication requested: flexerall    Other request: pt is having back spasms again and was treated before for this. She said the spasms typically come before her back goes out, she is hoping to get an rx for flexerall    Can we leave a detailed message on this number? YES    Phone number patient can be reached at: Home number on file [phone] (home)    Best Time: asap    Call taken on 8/13/2021 at 9:29 AM by Laquita Del Rio

## 2021-08-13 NOTE — TELEPHONE ENCOUNTER
I sent in a prescription for the Flexeril.  Also encourage her to use stretching, heat, and massage for the muscle spasm.

## 2021-09-02 ENCOUNTER — DOCUMENTATION ONLY (OUTPATIENT)
Dept: OTHER | Facility: CLINIC | Age: 51
End: 2021-09-02

## 2021-09-29 DIAGNOSIS — E11.29 TYPE 2 DIABETES MELLITUS WITH MICROALBUMINURIA, WITHOUT LONG-TERM CURRENT USE OF INSULIN (H): ICD-10-CM

## 2021-09-29 DIAGNOSIS — E78.5 HYPERLIPIDEMIA, UNSPECIFIED HYPERLIPIDEMIA TYPE: ICD-10-CM

## 2021-09-29 DIAGNOSIS — R80.9 TYPE 2 DIABETES MELLITUS WITH MICROALBUMINURIA, WITHOUT LONG-TERM CURRENT USE OF INSULIN (H): ICD-10-CM

## 2021-09-30 RX ORDER — SIMVASTATIN 20 MG
TABLET ORAL
Qty: 90 TABLET | Refills: 2 | Status: SHIPPED | OUTPATIENT
Start: 2021-09-30 | End: 2022-05-27

## 2021-10-16 ENCOUNTER — HEALTH MAINTENANCE LETTER (OUTPATIENT)
Age: 51
End: 2021-10-16

## 2021-10-21 NOTE — TELEPHONE ENCOUNTER
"Last Written Prescription Date:  9/24/20  Last Fill Quantity: 90,  # refills: 3   Last office visit provider:  7/16/21    Requested Prescriptions   Pending Prescriptions Disp Refills     simvastatin (ZOCOR) 20 MG tablet [Pharmacy Med Name: SIMVASTATIN 20 MG TABLET] 90 tablet 3     Sig: TAKE 1 TABLET BY MOUTH EVERYDAY AT BEDTIME       Statins Protocol Passed - 9/29/2021  2:23 PM        Passed - LDL on file in past 12 months     Recent Labs   Lab Test 12/21/20  0833   LDL 62             Passed - No abnormal creatine kinase in past 12 months     No lab results found.             Passed - Recent (12 mo) or future (30 days) visit within the authorizing provider's specialty     Patient has had an office visit with the authorizing provider or a provider within the authorizing providers department within the previous 12 mos or has a future within next 30 days. See \"Patient Info\" tab in inbasket, or \"Choose Columns\" in Meds & Orders section of the refill encounter.              Passed - Medication is active on med list        Passed - Patient is age 18 or older        Passed - No active pregnancy on record        Passed - No positive pregnancy test in past 12 months             Aydee Coley RN 09/30/21 11:03 PM  " no concerns

## 2021-11-04 DIAGNOSIS — F41.1 GENERALIZED ANXIETY DISORDER: ICD-10-CM

## 2021-11-04 DIAGNOSIS — Z79.899 HIGH RISK MEDICATION USE: Primary | ICD-10-CM

## 2021-11-04 DIAGNOSIS — M62.830 BACK MUSCLE SPASM: ICD-10-CM

## 2021-11-04 DIAGNOSIS — B00.9 HERPES SIMPLEX VIRUS (HSV) INFECTION: ICD-10-CM

## 2021-11-04 DIAGNOSIS — F40.01 AGORAPHOBIA WITH PANIC DISORDER: ICD-10-CM

## 2021-11-06 NOTE — TELEPHONE ENCOUNTER
Routing refill request to provider for review/approval because:  Controlled medication refill request.  Routing to provider's care team.    Last Written Prescription:      Last office visit provider:  7/16/21      Requested Prescriptions   Pending Prescriptions Disp Refills     LORazepam (ATIVAN) 1 MG tablet [Pharmacy Med Name: LORAZEPAM 1 MG TABLET] 30 tablet 0     Sig: TAKE 1 TABLET (1 MG TOTAL) BY MOUTH EVERY 8 (EIGHT) HOURS AS NEEDED FOR ANXIETY.       There is no refill protocol information for this order        cyclobenzaprine (FLEXERIL) 5 MG tablet [Pharmacy Med Name: CYCLOBENZAPRINE 5 MG TABLET] 30 tablet 0     Sig: TAKE 1 TABLET BY MOUTH 3 TIMES DAILY AS NEEDED FOR MUSCLE SPASMS       There is no refill protocol information for this order            Aydee Coley RN 11/06/21 6:26 PM

## 2021-11-08 RX ORDER — LIDOCAINE HYDROCHLORIDE 20 MG/ML
SOLUTION OROPHARYNGEAL
COMMUNITY
Start: 2021-11-04 | End: 2022-02-14

## 2021-11-08 RX ORDER — CYCLOBENZAPRINE HCL 5 MG
TABLET ORAL
Qty: 30 TABLET | Refills: 0 | Status: SHIPPED | OUTPATIENT
Start: 2021-11-08 | End: 2022-04-14

## 2021-11-08 RX ORDER — LORAZEPAM 1 MG/1
TABLET ORAL
Qty: 30 TABLET | Refills: 0 | Status: SHIPPED | OUTPATIENT
Start: 2021-11-08 | End: 2021-12-28

## 2021-11-08 RX ORDER — VALACYCLOVIR HYDROCHLORIDE 1 G/1
TABLET, FILM COATED ORAL
Qty: 10 TABLET | Refills: 5 | Status: SHIPPED | OUTPATIENT
Start: 2021-11-08 | End: 2023-09-05

## 2021-11-08 NOTE — TELEPHONE ENCOUNTER
Rx refilled. Needs controlled substance agreement and drug screen.  Please schedule lab only visit. OK to send controlled substance agreement via [employer], she can bring it in when she comes in for the drug screen (or she can just do it when she comes in).    Irene was seen today for medication refill.    Diagnoses and all orders for this visit:    Agoraphobia with panic disorder    Generalized anxiety disorder    Back muscle spasm    Last visit: 7/16/2021. No future appointments.  Controlled Substance Agreement: On file (>1 year ago).  Last Drug Screen:No results found for: THC13   [place]  Review:

## 2021-11-10 NOTE — TELEPHONE ENCOUNTER
Left message #1 at  [phone]. Postponing task out to a week and will try again. If patient returns call back, please help patient schedule an appointment per message below. Thanks!

## 2021-11-15 NOTE — TELEPHONE ENCOUNTER
Left message #2 at [phone]. Sending letter out and postponing task out to 2 weeks and will try again if an appointment hasn't been made. If patient returns call back, please help patient schedule an appointment per message below. Thanks!

## 2021-11-29 ENCOUNTER — IMMUNIZATION (OUTPATIENT)
Dept: NURSING | Facility: CLINIC | Age: 51
End: 2021-11-29
Payer: COMMERCIAL

## 2021-11-29 PROCEDURE — 0004A PR COVID VAC PFIZER DIL RECON 30 MCG/0.3 ML IM: CPT

## 2021-11-29 PROCEDURE — 91300 PR COVID VAC PFIZER DIL RECON 30 MCG/0.3 ML IM: CPT

## 2021-11-29 NOTE — TELEPHONE ENCOUNTER
Left message #3 at [phone]. If patient returns call back, please help patient schedule an appointment per message below. Thanks! We have made several attempts to contact patient by phone and letter to schedule an appointment. Unfortunately, our calls have not been returned and we were unable to schedule. At this time, we will no longer make an attempt to schedule this appointment. Completing task.

## 2021-12-13 ENCOUNTER — LAB (OUTPATIENT)
Dept: LAB | Facility: CLINIC | Age: 51
End: 2021-12-13
Payer: COMMERCIAL

## 2021-12-13 DIAGNOSIS — R80.9 TYPE 2 DIABETES MELLITUS WITH MICROALBUMINURIA, UNSPECIFIED WHETHER LONG TERM INSULIN USE: Primary | ICD-10-CM

## 2021-12-13 DIAGNOSIS — E78.5 HYPERLIPIDEMIA, UNSPECIFIED HYPERLIPIDEMIA TYPE: ICD-10-CM

## 2021-12-13 DIAGNOSIS — R80.9 TYPE 2 DIABETES MELLITUS WITH MICROALBUMINURIA, UNSPECIFIED WHETHER LONG TERM INSULIN USE: ICD-10-CM

## 2021-12-13 DIAGNOSIS — E11.29 TYPE 2 DIABETES MELLITUS WITH MICROALBUMINURIA, UNSPECIFIED WHETHER LONG TERM INSULIN USE: Primary | ICD-10-CM

## 2021-12-13 DIAGNOSIS — E11.29 TYPE 2 DIABETES MELLITUS WITH MICROALBUMINURIA, UNSPECIFIED WHETHER LONG TERM INSULIN USE: ICD-10-CM

## 2021-12-13 LAB
CREAT UR-MCNC: 111 MG/DL
HBA1C MFR BLD: 5.6 % (ref 0–5.6)
MICROALBUMIN UR-MCNC: <0.5 MG/DL (ref 0–1.99)
MICROALBUMIN/CREAT UR: NORMAL MG/G{CREAT}

## 2021-12-13 PROCEDURE — 83036 HEMOGLOBIN GLYCOSYLATED A1C: CPT

## 2021-12-13 PROCEDURE — 36415 COLL VENOUS BLD VENIPUNCTURE: CPT

## 2021-12-13 PROCEDURE — 82043 UR ALBUMIN QUANTITATIVE: CPT

## 2021-12-14 DIAGNOSIS — E11.29 TYPE 2 DIABETES MELLITUS WITH MICROALBUMINURIA, WITHOUT LONG-TERM CURRENT USE OF INSULIN (H): ICD-10-CM

## 2021-12-14 DIAGNOSIS — R80.9 TYPE 2 DIABETES MELLITUS WITH MICROALBUMINURIA, WITHOUT LONG-TERM CURRENT USE OF INSULIN (H): ICD-10-CM

## 2021-12-16 NOTE — TELEPHONE ENCOUNTER
"Last Written Prescription Date:  12/30/2020  Last Fill Quantity: 180,  # refills: 3   Last office visit provider:  7/16/2021 Dr. Elliott     metFORMIN (GLUCOPHAGE) 1000 MG tablet 180 tablet 3 12/30/2020  No   Sig: TAKE 1 TABLET BY MOUTH TWICE A DAY WITH MEALS   Sent to pharmacy as: metFORMIN 1,000 mg tablet (GLUCOPHAGE)   E-Prescribing Status: Receipt confirmed by pharmacy (12/30/2020 10:00 AM CST         Requested Prescriptions   Pending Prescriptions Disp Refills     metFORMIN (GLUCOPHAGE) 1000 MG tablet [Pharmacy Med Name: METFORMIN HCL 1,000 MG TABLET] 180 tablet 3     Sig: TAKE 1 TABLET BY MOUTH TWICE A DAY WITH MEALS       Biguanide Agents Passed - 12/14/2021 12:16 AM        Passed - Patient is age 10 or older        Passed - Patient has documented A1c within the specified period of time.     If HgbA1C is 8 or greater, it needs to be on file within the past 3 months.  If less than 8, must be on file within the past 6 months.     Recent Labs   Lab Test 12/13/21  1004   A1C 5.6             Passed - Patient's CR is NOT>1.4 OR Patient's EGFR is NOT<45 within past 12 mos.     Recent Labs   Lab Test 07/16/21  0745 12/21/20  0833   GFRESTIMATED 88 >60   GFRESTBLACK  --  >60       Recent Labs   Lab Test 07/16/21  0745   CR 0.78             Passed - Patient does NOT have a diagnosis of CHF.        Passed - Medication is active on med list        Passed - Patient is not pregnant        Passed - Patient has not had a positive pregnancy test within the past 12 mos.         Passed - Recent (6 mo) or future (30 days) visit within the authorizing provider's specialty     Patient had office visit in the last 6 months or has a visit in the next 30 days with authorizing provider or within the authorizing provider's specialty.  See \"Patient Info\" tab in inbasket, or \"Choose Columns\" in Meds & Orders section of the refill encounter.                 Leonor Iasacs RN 12/15/21 9:15 PM  "

## 2021-12-25 DIAGNOSIS — E11.29 TYPE 2 DIABETES MELLITUS WITH MICROALBUMINURIA, WITHOUT LONG-TERM CURRENT USE OF INSULIN (H): ICD-10-CM

## 2021-12-25 DIAGNOSIS — F41.1 GENERALIZED ANXIETY DISORDER: ICD-10-CM

## 2021-12-25 DIAGNOSIS — R80.9 TYPE 2 DIABETES MELLITUS WITH MICROALBUMINURIA, WITHOUT LONG-TERM CURRENT USE OF INSULIN (H): ICD-10-CM

## 2021-12-28 ENCOUNTER — MYC REFILL (OUTPATIENT)
Dept: FAMILY MEDICINE | Facility: CLINIC | Age: 51
End: 2021-12-28
Payer: COMMERCIAL

## 2021-12-28 DIAGNOSIS — F40.01 AGORAPHOBIA WITH PANIC DISORDER: ICD-10-CM

## 2021-12-28 DIAGNOSIS — F41.1 GENERALIZED ANXIETY DISORDER: ICD-10-CM

## 2021-12-28 RX ORDER — VENLAFAXINE HYDROCHLORIDE 75 MG/1
CAPSULE, EXTENDED RELEASE ORAL
Qty: 90 CAPSULE | Refills: 1 | Status: SHIPPED | OUTPATIENT
Start: 2021-12-28 | End: 2021-12-29

## 2021-12-28 RX ORDER — LISINOPRIL 5 MG/1
TABLET ORAL
Qty: 90 TABLET | Refills: 1 | Status: SHIPPED | OUTPATIENT
Start: 2021-12-28 | End: 2021-12-29

## 2021-12-28 NOTE — TELEPHONE ENCOUNTER
"        Last office visit provider:  7/16/2021     Requested Prescriptions   Pending Prescriptions Disp Refills     lisinopril (ZESTRIL) 5 MG tablet [Pharmacy Med Name: LISINOPRIL 5 MG TABLET] 90 tablet 3     Sig: TAKE 1 TABLET BY MOUTH EVERY DAY       ACE Inhibitors (Including Combos) Protocol Passed - 12/25/2021  7:02 AM        Passed - Blood pressure under 140/90 in past 12 months     BP Readings from Last 3 Encounters:   07/16/21 100/60   11/12/20 108/67   09/24/20 114/60                 Passed - Recent (12 mo) or future (30 days) visit within the authorizing provider's specialty     Patient has had an office visit with the authorizing provider or a provider within the authorizing providers department within the previous 12 mos or has a future within next 30 days. See \"Patient Info\" tab in inbasket, or \"Choose Columns\" in Meds & Orders section of the refill encounter.              Passed - Medication is active on med list        Passed - Patient is age 18 or older        Passed - No active pregnancy on record        Passed - Normal serum creatinine on file in past 12 months     Recent Labs   Lab Test 07/16/21  0745   CR 0.78       Ok to refill medication if creatinine is low          Passed - Normal serum potassium on file in past 12 months     Recent Labs   Lab Test 07/16/21  0745   POTASSIUM 4.6             Passed - No positive pregnancy test within past 12 months           venlafaxine (EFFEXOR-XR) 75 MG 24 hr capsule [Pharmacy Med Name: VENLAFAXINE HCL ER 75 MG CAP] 90 capsule 3     Sig: TAKE 1 CAPSULE BY MOUTH EVERY DAY       Serotonin-Norepinephrine Reuptake Inhibitors  Passed - 12/25/2021  7:02 AM        Passed - Blood pressure under 140/90 in past 12 months     BP Readings from Last 3 Encounters:   07/16/21 100/60   11/12/20 108/67   09/24/20 114/60                 Passed - Recent (12 mo) or future (30 days) visit within the authorizing provider's specialty     Patient has had an office visit with the " "authorizing provider or a provider within the authorizing providers department within the previous 12 mos or has a future within next 30 days. See \"Patient Info\" tab in inbasket, or \"Choose Columns\" in Meds & Orders section of the refill encounter.              Passed - Medication is active on med list        Passed - Patient is age 18 or older        Passed - No active pregnancy on record        Passed - Normal serum creatinine on file in past 12 months     Recent Labs   Lab Test 07/16/21  0745   CR 0.78       Ok to refill medication if creatinine is low          Passed - No positive pregnancy test in past 12 months             Sherri Tanner RN 12/28/21 2:39 PM  "

## 2021-12-29 DIAGNOSIS — E11.29 TYPE 2 DIABETES MELLITUS WITH MICROALBUMINURIA, WITHOUT LONG-TERM CURRENT USE OF INSULIN (H): ICD-10-CM

## 2021-12-29 DIAGNOSIS — R80.9 TYPE 2 DIABETES MELLITUS WITH MICROALBUMINURIA, WITHOUT LONG-TERM CURRENT USE OF INSULIN (H): ICD-10-CM

## 2021-12-29 DIAGNOSIS — F41.1 GENERALIZED ANXIETY DISORDER: ICD-10-CM

## 2021-12-29 RX ORDER — LISINOPRIL 5 MG/1
5 TABLET ORAL DAILY
Qty: 90 TABLET | Refills: 1 | Status: SHIPPED | OUTPATIENT
Start: 2021-12-29 | End: 2022-08-10

## 2021-12-29 RX ORDER — VENLAFAXINE HYDROCHLORIDE 75 MG/1
75 CAPSULE, EXTENDED RELEASE ORAL DAILY
Qty: 90 CAPSULE | Refills: 1 | Status: SHIPPED | OUTPATIENT
Start: 2021-12-29 | End: 2022-06-07

## 2021-12-30 DIAGNOSIS — E11.29 TYPE 2 DIABETES MELLITUS WITH MICROALBUMINURIA, WITHOUT LONG-TERM CURRENT USE OF INSULIN (H): ICD-10-CM

## 2021-12-30 DIAGNOSIS — R80.9 TYPE 2 DIABETES MELLITUS WITH MICROALBUMINURIA, WITHOUT LONG-TERM CURRENT USE OF INSULIN (H): ICD-10-CM

## 2021-12-30 DIAGNOSIS — F41.1 GENERALIZED ANXIETY DISORDER: ICD-10-CM

## 2021-12-31 NOTE — TELEPHONE ENCOUNTER
Routing refill request to provider for review/approval because:  Controlled substance request    Last Written Prescription Date:  11/8/21  Last Fill Quantity: 30,  # refills: 0   Last office visit provider:  7/16/21     Requested Prescriptions   Pending Prescriptions Disp Refills     LORazepam (ATIVAN) 1 MG tablet 30 tablet 0       There is no refill protocol information for this order          anjelica perera RN 12/30/21 6:44 PM

## 2022-01-02 RX ORDER — LISINOPRIL 5 MG/1
5 TABLET ORAL DAILY
Qty: 90 TABLET | Refills: 1 | OUTPATIENT
Start: 2022-01-02

## 2022-01-02 RX ORDER — VENLAFAXINE HYDROCHLORIDE 75 MG/1
75 CAPSULE, EXTENDED RELEASE ORAL DAILY
Qty: 90 CAPSULE | Refills: 1 | OUTPATIENT
Start: 2022-01-02

## 2022-01-02 NOTE — TELEPHONE ENCOUNTER
Duplicate Rx, medication was sent in recently to same pharmacy requested.     Jodie Torres RN, BSN Nurse Triage Advisor 8:03 AM 1/2/2022

## 2022-01-03 RX ORDER — LORAZEPAM 1 MG/1
1 TABLET ORAL EVERY 8 HOURS PRN
Qty: 30 TABLET | Refills: 0 | Status: SHIPPED | OUTPATIENT
Start: 2022-01-03 | End: 2022-02-15

## 2022-01-03 NOTE — TELEPHONE ENCOUNTER
Rebecca is due for a visit to discuss lorazepam.  Please schedule office visit (virtual is OK, but will need to come in for drug screen) and send controlled substance agreement for her to sign and return.    Last visit: 7/16/2021. No future appointments.  Controlled Substance Agreement: On file (>1 year ago).  Last Drug Screen:  Lab Results   Component Value Date    UAMP Screen Negative 10/29/2018    UBENZD Screen Negative 10/29/2018    OPIT Screen Negative 10/29/2018    UPCP Screen Negative 10/29/2018    UCANN Screen Negative 10/29/2018    UBARB Screen Negative 10/29/2018    UCOC Screen Negative 10/29/2018    OXYT Screen Negative 10/29/2018    UCRR 111 12/13/2021      [place]  Review:   not working

## 2022-01-04 NOTE — TELEPHONE ENCOUNTER
Left message #1 at [phone]. Postponing task out to a week and will try again. If patient returns call back, please help patient schedule an appointment per message below. Thanks!

## 2022-01-11 ENCOUNTER — TELEPHONE (OUTPATIENT)
Dept: FAMILY MEDICINE | Facility: CLINIC | Age: 52
End: 2022-01-11
Payer: COMMERCIAL

## 2022-01-11 NOTE — TELEPHONE ENCOUNTER
Left message #2 at [phone]. Sending letter out and postponing task out to 2 weeks and will try again if an appointment hasn't been made. If patient returns call back, please help patient schedule an appointment per message below. Thanks!

## 2022-02-14 ENCOUNTER — OFFICE VISIT (OUTPATIENT)
Dept: FAMILY MEDICINE | Facility: CLINIC | Age: 52
End: 2022-02-14
Payer: COMMERCIAL

## 2022-02-14 VITALS
HEIGHT: 65 IN | RESPIRATION RATE: 14 BRPM | BODY MASS INDEX: 24.99 KG/M2 | HEART RATE: 73 BPM | SYSTOLIC BLOOD PRESSURE: 110 MMHG | DIASTOLIC BLOOD PRESSURE: 67 MMHG | OXYGEN SATURATION: 99 % | WEIGHT: 150 LBS

## 2022-02-14 DIAGNOSIS — K58.1 IRRITABLE BOWEL SYNDROME WITH CONSTIPATION: ICD-10-CM

## 2022-02-14 DIAGNOSIS — E11.29 TYPE 2 DIABETES MELLITUS WITH MICROALBUMINURIA, WITHOUT LONG-TERM CURRENT USE OF INSULIN (H): ICD-10-CM

## 2022-02-14 DIAGNOSIS — Z12.11 COLON CANCER SCREENING: ICD-10-CM

## 2022-02-14 DIAGNOSIS — R80.9 TYPE 2 DIABETES MELLITUS WITH MICROALBUMINURIA, WITHOUT LONG-TERM CURRENT USE OF INSULIN (H): ICD-10-CM

## 2022-02-14 DIAGNOSIS — Z13.6 SCREENING FOR CARDIOVASCULAR CONDITION: ICD-10-CM

## 2022-02-14 DIAGNOSIS — K59.04 FUNCTIONAL CONSTIPATION: ICD-10-CM

## 2022-02-14 DIAGNOSIS — Z00.00 ROUTINE GENERAL MEDICAL EXAMINATION AT A HEALTH CARE FACILITY: Primary | ICD-10-CM

## 2022-02-14 PROBLEM — E83.42 HYPOMAGNESEMIA: Status: ACTIVE | Noted: 2020-12-22

## 2022-02-14 LAB
CHOLEST SERPL-MCNC: 158 MG/DL
FASTING STATUS PATIENT QL REPORTED: YES
HDLC SERPL-MCNC: 66 MG/DL
LDLC SERPL CALC-MCNC: 81 MG/DL
TRIGL SERPL-MCNC: 55 MG/DL

## 2022-02-14 PROCEDURE — 99396 PREV VISIT EST AGE 40-64: CPT | Performed by: FAMILY MEDICINE

## 2022-02-14 PROCEDURE — 80061 LIPID PANEL: CPT | Performed by: FAMILY MEDICINE

## 2022-02-14 PROCEDURE — 36415 COLL VENOUS BLD VENIPUNCTURE: CPT | Performed by: FAMILY MEDICINE

## 2022-02-14 RX ORDER — AMOXICILLIN 250 MG
2 CAPSULE ORAL DAILY PRN
Qty: 100 TABLET | Refills: 1 | Status: SHIPPED | OUTPATIENT
Start: 2022-02-14

## 2022-02-14 ASSESSMENT — MIFFLIN-ST. JEOR: SCORE: 1296.28

## 2022-02-14 NOTE — PROGRESS NOTES
"   SUBJECTIVE:   CC: Irene Terrazas is an 51 year old woman who presents for preventive health visit.     Chronic constipation - 8-10 years  IBS with diarrhea - growing up  Even with double prep colonoscopy 12/21 - still had debris  Frequency 8-10 days  Hard  Treatments tried: MoM, softeners, enemas,Miralax, mag citrate, psyllium. Eats fiber, drinks water, exercises.    Patient has been advised of split billing requirements and indicates understanding: Yes  Healthy Habits:     Getting at least 3 servings of Calcium per day:  Yes    Bi-annual eye exam:  Yes    Dental care twice a year:  Yes    Sleep apnea or symptoms of sleep apnea:  None    Diet:  Vegetarian/vegan    Frequency of exercise:  6-7 days/week    Duration of exercise:  Greater than 60 minutes    Taking medications regularly:  Yes    Medication side effects:  None    PHQ-2 Total Score: 2    Additional concerns today:  Yes    Vegetarian diet.    Today's PHQ-2 Score:   PHQ-2 ( 1999 [employer]) 2/14/2022   Q1: Little interest or pleasure in doing things 1   Q2: Feeling down, depressed or hopeless 1   PHQ-2 Score 2   PHQ-2 Total Score (12-17 Years)- Positive if 3 or more points; Administer PHQ-A if positive -   Q1: Little interest or pleasure in doing things Several days   Q2: Feeling down, depressed or hopeless Several days   PHQ-2 Score 2     Abuse: Current or Past (Physical, Sexual or Emotional) - No  Do you feel safe in your environment? Yes    Social History     Tobacco Use     Smoking status: Never Smoker     Smokeless tobacco: Never Used   Substance Use Topics     Alcohol use: Yes     Comment: Alcoholic Drinks/day: \"once a month\"     If you drink alcohol do you typically have >3 drinks per day or >7 drinks per week? No    Alcohol Use 2/14/2022   Prescreen: >3 drinks/day or >7 drinks/week? No   Prescreen: >3 drinks/day or >7 drinks/week? -   No flowsheet data found.    Reviewed orders with patient.  Reviewed health maintenance and updated orders " "accordingly - Yes  Breast Cancer Screening:    Breast CA Risk Assessment (FHS-7) 2/14/2022   Do you have a family history of breast, colon, or ovarian cancer? No / Unknown     Pertinent mammograms are reviewed under the imaging tab.     Reviewed and updated as needed this visit by clinical staff  Tobacco  Allergies  Meds  Problems  Med Hx  Surg Hx  Fam Hx  Soc Hx         Reviewed and updated as needed this visit by Provider  Tobacco  Allergies  Meds  Problems  Med Hx  Surg Hx  Fam Hx           OBJECTIVE:   /67 (BP Location: Left arm, Patient Position: Sitting, Cuff Size: Adult Regular)   Pulse 73   Resp 14   Ht 1.651 m (5' 5\")   Wt 68 kg (150 lb)   SpO2 99%   BMI 24.96 kg/m    Physical Exam  GENERAL: healthy, alert and no distress  EYES: Eyes grossly normal to inspection, PERRL and conjunctivae and sclerae normal  HENT: ear canals and TM's normal, nose and mouth without ulcers or lesions  NECK: no adenopathy, no asymmetry, masses, or scars and thyroid normal to palpation  RESP: lungs clear to auscultation - no rales, rhonchi or wheezes  BREAST: normal without masses, tenderness or nipple discharge and no palpable axillary masses or adenopathy  CV: regular rate and rhythm, normal S1 S2, no S3 or S4, no murmur, click or rub, no peripheral edema and peripheral pulses strong  ABDOMEN: soft, nontender, no hepatosplenomegaly, no masses and bowel sounds normal  MS: no gross musculoskeletal defects noted, no edema  SKIN: no suspicious lesions or rashes. Solar lentigo and seborrheic keratosis.  NEURO: Normal strength and tone, mentation intact and speech normal  PSYCH: mentation appears normal, affect normal/bright    Diagnostic Test Results:  Labs reviewed in Epic    ASSESSMENT/PLAN:   Irene was seen today for physical.    Diagnoses and all orders for this visit:    Routine general medical examination at a health care facility    Type 2 diabetes mellitus with microalbuminuria, without long-term " "current use of insulin (H)  -     Adult Eye Referral; Future    Colon cancer screening  -     Obtain records from [facility]    Screening for cardiovascular condition  -     Lipid panel reflex to direct LDL Fasting; Future  -     Lipid panel reflex to direct LDL Fasting    Functional constipation  -     senna-docusate (SENOKOT-S/PERICOLACE) 8.6-50 MG tablet; Take 2 tablets by mouth daily as needed for constipation    Irritable bowel syndrome with constipation  -     linaclotide (LINZESS) 72 MCG capsule; Take 1 capsule (72 mcg) by mouth every morning (before breakfast)      COUNSELING:  Reviewed preventive health counseling, as reflected in patient instructions    Estimated body mass index is 24.96 kg/m  as calculated from the following:    Height as of this encounter: 1.651 m (5' 5\").    Weight as of this encounter: 68 kg (150 lb).    She reports that she has never smoked. She has never used smokeless tobacco.      Counseling Resources:  ATP IV Guidelines  Pooled Cohorts Equation Calculator  Breast Cancer Risk Calculator  BRCA-Related Cancer Risk Assessment: FHS-7 Tool  FRAX Risk Assessment  ICSI Preventive Guidelines  Dietary Guidelines for Americans, 2010  USDA's MyPlate  ASA Prophylaxis  Lung CA Screening    Tara Elliott MD  [facility]  "

## 2022-02-15 ENCOUNTER — MYC REFILL (OUTPATIENT)
Dept: FAMILY MEDICINE | Facility: CLINIC | Age: 52
End: 2022-02-15
Payer: COMMERCIAL

## 2022-02-15 ENCOUNTER — TELEPHONE (OUTPATIENT)
Dept: FAMILY MEDICINE | Facility: CLINIC | Age: 52
End: 2022-02-15
Payer: COMMERCIAL

## 2022-02-15 ENCOUNTER — MYC MEDICAL ADVICE (OUTPATIENT)
Dept: FAMILY MEDICINE | Facility: CLINIC | Age: 52
End: 2022-02-15
Payer: COMMERCIAL

## 2022-02-15 DIAGNOSIS — F40.01 AGORAPHOBIA WITH PANIC DISORDER: ICD-10-CM

## 2022-02-15 DIAGNOSIS — K59.09 CHRONIC CONSTIPATION: Primary | ICD-10-CM

## 2022-02-15 DIAGNOSIS — F41.1 GENERALIZED ANXIETY DISORDER: ICD-10-CM

## 2022-02-15 DIAGNOSIS — K58.1 IRRITABLE BOWEL SYNDROME WITH CONSTIPATION: ICD-10-CM

## 2022-02-15 NOTE — TELEPHONE ENCOUNTER
TAMMY PA REQUEST    Prior Authorization Retail Medication Request    Medication/Dose: Linzess 72 mcg  ICD code (if different than what is on RX):  NA  Previously Tried and Failed:  Following with GI specialist for IBS-C. Prescribed Senna-Docusate. Has tried milk of magnesium, miralax, enemas, mag citrate, fiber.     Insurance Name:  [facility]  Insurance ID:  [id number]

## 2022-02-17 ENCOUNTER — TELEPHONE (OUTPATIENT)
Dept: FAMILY MEDICINE | Facility: CLINIC | Age: 52
End: 2022-02-17
Payer: COMMERCIAL

## 2022-02-17 RX ORDER — LORAZEPAM 1 MG/1
1 TABLET ORAL EVERY 8 HOURS PRN
Qty: 30 TABLET | Refills: 0 | Status: SHIPPED | OUTPATIENT
Start: 2022-02-17 | End: 2022-06-07

## 2022-02-17 NOTE — TELEPHONE ENCOUNTER
Routing refill request to provider for review/approval because:  Controlled substance request.    Last Written Prescription Date:  1/3/22  Last Fill Quantity: 30,  # refills: 0   Last office visit provider:  2/14/22     Requested Prescriptions   Pending Prescriptions Disp Refills     LORazepam (ATIVAN) 1 MG tablet 30 tablet 0     Sig: Take 1 tablet (1 mg) by mouth every 8 hours as needed for anxiety       There is no refill protocol information for this order          Myesha Haddad RN 02/17/22 2:27 PM

## 2022-02-17 NOTE — TELEPHONE ENCOUNTER
MTM referral from: [facility] visit (referral by provider)    MTM referral outreach attempt #2 on February 17, 2022 at 10:05 AM      Outcome: Patient not reachable after several attempts, will route to MTM Pharmacist/Provider as an FYI.  MT scheduling number is [phone].  Thank you for the referral.    Markus Castellon, MTM coordinator

## 2022-02-22 NOTE — TELEPHONE ENCOUNTER
Central Prior Authorization Team  Phone: [phone]    PA Initiation    Medication: LINZESS 72 MCG capsule  Insurance Company: [employer] - Phone [phone] Fax [phone]  Pharmacy Filling the Rx: [id number] - [place], [place] - [address]  Filling Pharmacy Phone: [phone]  Filling Pharmacy Fax:    Start Date: 2/22/2022

## 2022-02-23 NOTE — TELEPHONE ENCOUNTER
Per call to insurance, pa is currently under review and should have a determination by the end of the day on 2/24.

## 2022-02-24 ENCOUNTER — TELEPHONE (OUTPATIENT)
Dept: FAMILY MEDICINE | Facility: CLINIC | Age: 52
End: 2022-02-24
Payer: COMMERCIAL

## 2022-02-24 RX ORDER — PLECANATIDE 3 MG/1
3 TABLET ORAL DAILY
Qty: 30 TABLET | Refills: 3 | Status: SHIPPED | OUTPATIENT
Start: 2022-02-24 | End: 2022-02-28

## 2022-02-24 NOTE — TELEPHONE ENCOUNTER
PRIOR AUTHORIZATION DENIED    Medication: LINZESS 72 MCG capsule- DENIED     Denial Date: 2/24/2022    Denial Rational: Patient must have a history of trial & failure to the formulary alternative(s) or have a contraindication or intolerance to the formulary alternatives:  - Trulance       Appeal Information: If provider would like to appeal please provide a letter of medical necessity.

## 2022-02-28 DIAGNOSIS — K59.09 CHRONIC CONSTIPATION: ICD-10-CM

## 2022-02-28 DIAGNOSIS — K58.1 IRRITABLE BOWEL SYNDROME WITH CONSTIPATION: ICD-10-CM

## 2022-02-28 RX ORDER — PLECANATIDE 3 MG/1
3 TABLET ORAL DAILY
Qty: 30 TABLET | Refills: 3 | Status: SHIPPED | OUTPATIENT
Start: 2022-02-28 | End: 2022-09-06

## 2022-03-03 NOTE — TELEPHONE ENCOUNTER
Central Prior Authorization Team   Phone: [phone]    PA Initiation    Medication: TRULANCE 3 MG tablet  Insurance Company: [employer] - Phone [phone] Fax [phone]  Pharmacy Filling the Rx: [id number] - [place], [place] - [address]  Filling Pharmacy Phone: [phone]  Filling Pharmacy Fax:    Start Date: 3/3/2022

## 2022-03-09 NOTE — TELEPHONE ENCOUNTER
Prior Authorization Approval    Authorization Effective Date: 3/7/2022  Authorization Expiration Date: 3/7/2023  Medication: TRULANCE 3 MG tablet  Approved Dose/Quantity:   Reference #:     Insurance Company: [employer] - Phone [phone] Fax [phone]  Expected CoPay:       CoPay Card Available:      Foundation Assistance Needed:    Which Pharmacy is filling the prescription (Not needed for infusion/clinic administered): [id number] - [place], [place] - [address]  Pharmacy Notified: Yes  Patient Notified: Yes

## 2022-03-10 DIAGNOSIS — E11.29 TYPE 2 DIABETES MELLITUS WITH MICROALBUMINURIA, WITHOUT LONG-TERM CURRENT USE OF INSULIN (H): ICD-10-CM

## 2022-03-10 DIAGNOSIS — R80.9 TYPE 2 DIABETES MELLITUS WITH MICROALBUMINURIA, WITHOUT LONG-TERM CURRENT USE OF INSULIN (H): ICD-10-CM

## 2022-03-11 NOTE — TELEPHONE ENCOUNTER
"Last Written Prescription Date:  12/15/21  Last Fill Quantity: 180,  # refills: 0   Last office visit provider:  2/14/22     Requested Prescriptions   Pending Prescriptions Disp Refills     metFORMIN (GLUCOPHAGE) 1000 MG tablet [Pharmacy Med Name: METFORMIN HCL 1,000 MG TABLET] 180 tablet 0     Sig: TAKE 1 TABLET BY MOUTH TWICE A DAY WITH MEALS       Biguanide Agents Passed - 3/10/2022  3:12 PM        Passed - Patient is age 10 or older        Passed - Patient has documented A1c within the specified period of time.     If HgbA1C is 8 or greater, it needs to be on file within the past 3 months.  If less than 8, must be on file within the past 6 months.     Recent Labs   Lab Test 12/13/21  1004   A1C 5.6             Passed - Patient's CR is NOT>1.4 OR Patient's EGFR is NOT<45 within past 12 mos.     Recent Labs   Lab Test 07/16/21  0745 12/21/20  0833   GFRESTIMATED 88 >60   GFRESTBLACK  --  >60       Recent Labs   Lab Test 07/16/21  0745   CR 0.78             Passed - Patient does NOT have a diagnosis of CHF.        Passed - Medication is active on med list        Passed - Patient is not pregnant        Passed - Patient has not had a positive pregnancy test within the past 12 mos.         Passed - Recent (6 mo) or future (30 days) visit within the authorizing provider's specialty     Patient had office visit in the last 6 months or has a visit in the next 30 days with authorizing provider or within the authorizing provider's specialty.  See \"Patient Info\" tab in inbasket, or \"Choose Columns\" in Meds & Orders section of the refill encounter.                 Joslyn David RN 03/11/22 8:53 AM  "

## 2022-04-14 DIAGNOSIS — M62.830 BACK MUSCLE SPASM: ICD-10-CM

## 2022-04-14 RX ORDER — CYCLOBENZAPRINE HCL 5 MG
TABLET ORAL
Qty: 30 TABLET | Refills: 0 | Status: SHIPPED | OUTPATIENT
Start: 2022-04-14 | End: 2024-06-24

## 2022-05-26 DIAGNOSIS — R80.9 TYPE 2 DIABETES MELLITUS WITH MICROALBUMINURIA, WITHOUT LONG-TERM CURRENT USE OF INSULIN (H): ICD-10-CM

## 2022-05-26 DIAGNOSIS — E78.5 HYPERLIPIDEMIA, UNSPECIFIED HYPERLIPIDEMIA TYPE: ICD-10-CM

## 2022-05-26 DIAGNOSIS — E11.29 TYPE 2 DIABETES MELLITUS WITH MICROALBUMINURIA, WITHOUT LONG-TERM CURRENT USE OF INSULIN (H): ICD-10-CM

## 2022-05-26 DIAGNOSIS — F41.1 GENERALIZED ANXIETY DISORDER: ICD-10-CM

## 2022-05-26 DIAGNOSIS — F40.01 AGORAPHOBIA WITH PANIC DISORDER: ICD-10-CM

## 2022-05-27 RX ORDER — SIMVASTATIN 20 MG
TABLET ORAL
Qty: 90 TABLET | Refills: 2 | Status: SHIPPED | OUTPATIENT
Start: 2022-05-27 | End: 2023-02-21

## 2022-05-27 NOTE — TELEPHONE ENCOUNTER
"Routing refill request to provider for review/approval because:  Drug not on the [facility] refill protocol     Last Written Prescription Date:  2/17/22  Last Fill Quantity: 30,  # refills: 0   Last office visit provider:  2/14/22     Requested Prescriptions   Pending Prescriptions Disp Refills     LORazepam (ATIVAN) 1 MG tablet [Pharmacy Med Name: LORAZEPAM 1 MG TABLET] 30 tablet 0     Sig: TAKE 1 TABLET BY MOUTH EVERY 8 HOURS AS NEEDED FOR ANXIETY.       There is no refill protocol information for this order      Signed Prescriptions Disp Refills    simvastatin (ZOCOR) 20 MG tablet 90 tablet 2     Sig: TAKE 1 TABLET BY MOUTH EVERYDAY AT BEDTIME       Statins Protocol Passed - 5/26/2022  4:14 PM        Passed - LDL on file in past 12 months     Recent Labs   Lab Test 02/14/22  0933   LDL 81             Passed - No abnormal creatine kinase in past 12 months     No lab results found.             Passed - Recent (12 mo) or future (30 days) visit within the authorizing provider's specialty     Patient has had an office visit with the authorizing provider or a provider within the authorizing providers department within the previous 12 mos or has a future within next 30 days. See \"Patient Info\" tab in inbasket, or \"Choose Columns\" in Meds & Orders section of the refill encounter.              Passed - Medication is active on med list        Passed - Patient is age 18 or older        Passed - No active pregnancy on record        Passed - No positive pregnancy test in past 12 months             Joyce You RN 05/27/22 6:33 PM  "

## 2022-05-27 NOTE — TELEPHONE ENCOUNTER
"Last Written Prescription Date:  9/30/21  Last Fill Quantity: 90,  # refills: 2   Last office visit provider:  2/14/22     Requested Prescriptions   Pending Prescriptions Disp Refills     LORazepam (ATIVAN) 1 MG tablet [Pharmacy Med Name: LORAZEPAM 1 MG TABLET] 30 tablet 0     Sig: TAKE 1 TABLET BY MOUTH EVERY 8 HOURS AS NEEDED FOR ANXIETY.       There is no refill protocol information for this order        simvastatin (ZOCOR) 20 MG tablet [Pharmacy Med Name: SIMVASTATIN 20 MG TABLET] 90 tablet 2     Sig: TAKE 1 TABLET BY MOUTH EVERYDAY AT BEDTIME       Statins Protocol Passed - 5/26/2022  4:14 PM        Passed - LDL on file in past 12 months     Recent Labs   Lab Test 02/14/22  0933   LDL 81             Passed - No abnormal creatine kinase in past 12 months     No lab results found.             Passed - Recent (12 mo) or future (30 days) visit within the authorizing provider's specialty     Patient has had an office visit with the authorizing provider or a provider within the authorizing providers department within the previous 12 mos or has a future within next 30 days. See \"Patient Info\" tab in inbasket, or \"Choose Columns\" in Meds & Orders section of the refill encounter.              Passed - Medication is active on med list        Passed - Patient is age 18 or older        Passed - No active pregnancy on record        Passed - No positive pregnancy test in past 12 months             Joyce You RN 05/27/22 6:32 PM  "

## 2022-05-31 RX ORDER — LORAZEPAM 1 MG/1
TABLET ORAL
Qty: 30 TABLET | Refills: 0 | OUTPATIENT
Start: 2022-05-31

## 2022-05-31 NOTE — TELEPHONE ENCOUNTER
Due for controlled substance agreement. Please schedule virtual visit.   Irene was seen today for medication refill.    Diagnoses and all orders for this visit:    Agoraphobia with panic disorder    Generalized anxiety disorder    Type 2 diabetes mellitus with microalbuminuria, without long-term current use of insulin (H)  -     simvastatin (ZOCOR) 20 MG tablet; TAKE 1 TABLET BY MOUTH EVERYDAY AT BEDTIME    Hyperlipidemia, unspecified hyperlipidemia type  -     simvastatin (ZOCOR) 20 MG tablet; TAKE 1 TABLET BY MOUTH EVERYDAY AT BEDTIME    Last visit: 2/14/2022. No future appointments.  Controlled Substance Agreement: On file (>1 year ago).  Last Drug Screen:  Lab Results   Component Value Date    UAMP Screen Negative 10/29/2018    UBENZD Screen Negative 10/29/2018    OPIT Screen Negative 10/29/2018    UPCP Screen Negative 10/29/2018    UCANN Screen Negative 10/29/2018    UBARB Screen Negative 10/29/2018    UCOC Screen Negative 10/29/2018    OXYT Screen Negative 10/29/2018    UCRR 111 12/13/2021

## 2022-06-07 ENCOUNTER — VIRTUAL VISIT (OUTPATIENT)
Dept: FAMILY MEDICINE | Facility: CLINIC | Age: 52
End: 2022-06-07
Payer: COMMERCIAL

## 2022-06-07 DIAGNOSIS — F43.23 ADJUSTMENT DISORDER WITH MIXED ANXIETY AND DEPRESSED MOOD: Primary | ICD-10-CM

## 2022-06-07 DIAGNOSIS — R80.9 TYPE 2 DIABETES MELLITUS WITH MICROALBUMINURIA, UNSPECIFIED WHETHER LONG TERM INSULIN USE: ICD-10-CM

## 2022-06-07 DIAGNOSIS — F40.01 AGORAPHOBIA WITH PANIC DISORDER: ICD-10-CM

## 2022-06-07 DIAGNOSIS — E11.29 TYPE 2 DIABETES MELLITUS WITH MICROALBUMINURIA, UNSPECIFIED WHETHER LONG TERM INSULIN USE: ICD-10-CM

## 2022-06-07 DIAGNOSIS — Z12.31 VISIT FOR SCREENING MAMMOGRAM: ICD-10-CM

## 2022-06-07 DIAGNOSIS — F41.1 GENERALIZED ANXIETY DISORDER: ICD-10-CM

## 2022-06-07 PROCEDURE — 99214 OFFICE O/P EST MOD 30 MIN: CPT | Mod: 95 | Performed by: FAMILY MEDICINE

## 2022-06-07 RX ORDER — LORAZEPAM 1 MG/1
1 TABLET ORAL EVERY 8 HOURS PRN
Qty: 20 TABLET | Refills: 0 | Status: SHIPPED | OUTPATIENT
Start: 2022-06-07 | End: 2022-12-01

## 2022-06-07 RX ORDER — HYDROXYZINE PAMOATE 50 MG/1
50 CAPSULE ORAL
Qty: 50 CAPSULE | Refills: 3 | Status: SHIPPED | OUTPATIENT
Start: 2022-06-07 | End: 2023-06-20

## 2022-06-07 RX ORDER — VENLAFAXINE HYDROCHLORIDE 150 MG/1
150 CAPSULE, EXTENDED RELEASE ORAL DAILY
Qty: 90 CAPSULE | Refills: 3 | Status: SHIPPED | OUTPATIENT
Start: 2022-06-07 | End: 2023-12-21 | Stop reason: DRUGHIGH

## 2022-06-07 ASSESSMENT — PATIENT HEALTH QUESTIONNAIRE - PHQ9
5. POOR APPETITE OR OVEREATING: SEVERAL DAYS
SUM OF ALL RESPONSES TO PHQ QUESTIONS 1-9: 6

## 2022-06-07 ASSESSMENT — ANXIETY QUESTIONNAIRES
2. NOT BEING ABLE TO STOP OR CONTROL WORRYING: SEVERAL DAYS
GAD7 TOTAL SCORE: 5
6. BECOMING EASILY ANNOYED OR IRRITABLE: SEVERAL DAYS
5. BEING SO RESTLESS THAT IT IS HARD TO SIT STILL: NOT AT ALL
3. WORRYING TOO MUCH ABOUT DIFFERENT THINGS: SEVERAL DAYS
IF YOU CHECKED OFF ANY PROBLEMS ON THIS QUESTIONNAIRE, HOW DIFFICULT HAVE THESE PROBLEMS MADE IT FOR YOU TO DO YOUR WORK, TAKE CARE OF THINGS AT HOME, OR GET ALONG WITH OTHER PEOPLE: NOT DIFFICULT AT ALL
7. FEELING AFRAID AS IF SOMETHING AWFUL MIGHT HAPPEN: NOT AT ALL
GAD7 TOTAL SCORE: 5
1. FEELING NERVOUS, ANXIOUS, OR ON EDGE: SEVERAL DAYS

## 2022-06-07 NOTE — PATIENT INSTRUCTIONS
Adjustment disorder with anxiety + depression  -increase Effexor-XR to 150 mg daily  -see therapist for 6 months  -use hydroxyzine (Vistaril) for sleep  -use lorazepam as needed for panic/uncontrolled anxiety  -complete controlled substance agreement.

## 2022-06-07 NOTE — LETTER
Opioid / Opioid Plus Controlled Substance Agreement    This is an agreement between you and your provider about the safe and appropriate use of controlled substance/opioids prescribed by your care team. Controlled substances are medicines that can cause physical and mental dependence (abuse).    There are strict laws about having and using these medicines. We here at [facility] are committing to working with you in your efforts to get better. To support you in this work, we ll help you schedule regular office appointments for medicine refills. If we must cancel or change your appointment for any reason, we ll make sure you have enough medicine to last until your next appointment.     As a Provider, I will:    Listen carefully to your concerns and treat you with respect.     Recommend a treatment plan that I believe is in your best interest. This plan may involve therapies other than opioid pain medication.     Talk with you often about the possible benefits, and the risk of harm of any medicine that we prescribe for you.     Provide a plan on how to taper (discontinue or go off) using this medicine if the decision is made to stop its use.    As a Patient, I understand that opioid(s):     Are a controlled substance prescribed by my care team to help me function or work and manage my condition(s).     Are strong medicines and can cause serious side effects such as:    Drowsiness, which can seriously affect my driving ability    A lower breathing rate, enough to cause death    Harm to my thinking ability     Depression     Abuse of and addiction to this medicine    Need to be taken exactly as prescribed. Combining opioids with certain medicines or chemicals (such as illegal drugs, sedatives, sleeping pills, and benzodiazepines) can be dangerous or even fatal. If I stop opioids suddenly, I may have severe withdrawal symptoms.    Do not work for all types of pain nor for all patients. If they re not helpful, I may  be asked to stop them.        The risks, benefits and side effects of these medicine(s) were explained to me. I agree that:  1. I will take part in other treatments as advised by my care team. This may be psychiatry or counseling, physical therapy, behavioral therapy, group treatment or a referral to a specialist.     2. I will keep all my appointments. I understand that this is part of the monitoring of opioids. My care team may require an office visit for EVERY opioid/controlled substance refill. If I miss appointments or don t follow instructions, my care team may stop my medicine.    3. I will take my medicines as prescribed. I will not change the dose or schedule unless my care team tells me to. There will be no refills if I run out early.     4. I may be asked to come to the clinic and complete a urine drug test or complete a pill count at any time. If I don t give a urine sample or participate in a pill count, the care team may stop my medicine.    5. I will only receive prescriptions from this clinic for chronic pain. If I am treated by another provider for acute pain issues, I will tell them that I am taking opioid pain medication for chronic pain and that I have a treatment agreement with this provider. I will inform my [facility] care team within one business day if I am given a prescription for any pain medication by another healthcare provider. My [facility] care team can contact other providers and pharmacists about my use of any medicines.    6. It is up to me to make sure that I don t run out of my medicines on weekends or holidays. If my care team is willing to refill my opioid prescription without a visit, I must request refills only during office hours. Refills may take up to 3 business days to process. I will use one pharmacy to fill all my opioid and other controlled substance prescriptions. I will notify the clinic about any changes to my insurance or medication  availability.    7. I am responsible for my prescriptions. If the medicine/prescription is lost, stolen or destroyed, it will not be replaced. I also agree not to share controlled substance medicines with anyone.    8. I am aware I should not use any illegal or recreational drugs. I agree not to drink alcohol unless my care team says I can.       9. If I enroll in the [place] [facility] Cann[facility] program, I will tell my care team prior to my next refill.     10. I will tell my care team right away if I become pregnant, have a new medical problem treated outside of my regular clinic, or have a change in my medications.    11. I understand that this medicine can affect my thinking, judgment and reaction time. Alcohol and drugs affect the brain and body, which can affect the safety of my driving. Being under the influence of alcohol or drugs can affect my decision-making, behaviors, personal safety, and the safety of others. Driving while impaired (DWI) can occur if a person is driving, operating, or in physical control of a car, motorcycle, boat, snowmobile, ATV, motorbike, off-road vehicle, or any other motor vehicle ([place] Statute 169A.20). I understand the risk if I choose to drive or operate any vehicle or machinery.    I understand that if I do not follow any of the conditions above, my prescriptions or treatment may be stopped or changed.          Opioids  What You Need to Know    What are opioids?   Opioids are pain medicines that must be prescribed by a doctor. They are also known as narcotics.     Examples are:   1. morphine (MS Contin, Kaylene)  2. oxycodone (Oxycontin)  3. oxycodone and acetaminophen (Percocet)  4. hydrocodone and acetaminophen (Vicodin, Norco)   5. fentanyl patch (Duragesic)   6. hydromorphone (Dilaudid)   7. methadone  8. codeine (Tylenol #3)     What do opioids do well?   Opioids are best for severe short-term pain such as after a surgery or injury. They may work well for cancer pain. They may  help some people with long-lasting (chronic) pain.     What do opioids NOT do well?   Opioids never get rid of pain entirely, and they don t work well for most patients with chronic pain. Opioids don t reduce swelling, one of the causes of pain.                                    Other ways to manage chronic pain and improve function include:       Treat the health problem that may be causing pain    Anti-inflammation medicines, which reduce swelling and tenderness, such as ibuprofen (Advil, Motrin) or naproxen (Aleve)    Acetaminophen (Tylenol)    Antidepressants and anti-seizure medicines, especially for nerve pain    Topical treatments such as patches or creams    Injections or nerve blocks    Chiropractic or osteopathic treatment    Acupuncture, massage, deep breathing, meditation, visual imagery, aromatherapy    Use heat or ice at the pain site    Physical therapy     Exercise    Stop smoking    Take part in therapy       Risks and side effects     Talk to your doctor before you start or decide to keep taking opioids. Possible side effects include:      Lowering your breathing rate enough to cause death    Overdose, including death, especially if taking higher than prescribed doses    Worse depression symptoms; less pleasure in things you usually enjoy    Feeling tired or sluggish    Slower thoughts or cloudy thinking    Being more sensitive to pain over time; pain is harder to control    Trouble sleeping or restless sleep    Changes in hormone levels (for example, less testosterone)    Changes in sex drive or ability to have sex    Constipation    Unsafe driving    Itching and sweating    Dizziness    Nausea, throwing up and dry mouth    What else should I know about opioids?    Opioids may lead to dependence, tolerance, or addiction.      Dependence means that if you stop or reduce the medicine too quickly, you will have withdrawal symptoms. These include loose poop (diarrhea), jitters, flu-like symptoms,  nervousness and tremors. Dependence is not the same as addiction.                       Tolerance means needing higher doses over time to get the same effect. This may increase the chance of serious side effects.      Addiction is when people improperly use a substance that harms their body, their mind or their relations with others. Use of opiates can cause a relapse of addiction if you have a history of drug or alcohol abuse.      People who have used opioids for a long time may have a lower quality of life, worse depression, higher levels of pain and more visits to doctors.    You can overdose on opioids. Take these steps to lower your risk of overdose:    1. Recognize the signs:  Signs of overdose include decrease or loss of consciousness (blackout), slowed breathing, trouble waking up and blue lips. If someone is worried about overdose, they should call 911.    2. Talk to your doctor about Narcan (naloxone).   If you are at risk for overdose, you may be given a prescription for Narcan. This medicine very quickly reverses the effects of opioids.   If you overdose, a friend or family member can give you Narcan while waiting for the ambulance. They need to know the signs of overdose and how to give Narcan.     3. Don't use alcohol or street drugs.   Taking them with opioids can cause death.    4. Do not take any of these medicines unless your doctor says it s OK. Taking these with opioids can cause death:    Benzodiazepines, such as lorazepam (Ativan), alprazolam (Xanax) or diazepam (Valium)    Muscle relaxers, such as cyclobenzaprine (Flexeril)    Sleeping pills like zolpidem (Ambien)     Other opioids      How to keep you and other people safe while taking opioids:    1. Never share your opioids with others.  Opioid medicines are regulated by the Drug Enforcement Agency (JESS). Selling or sharing medications is a criminal act.    2. Be sure to store opioids in a secure place, locked up if possible. Young children  can easily swallow them and overdose.    3. When you are traveling with your medicines, keep them in the original bottles. If you use a pill box, be sure you also carry a copy of your medicine list from your clinic or pharmacy.    4. Safe disposal of opioids    Most pharmacies have places to get rid of medicine, called disposal kiosks. Medicine disposal options are also available in every [facility]. Search your county and  medication disposal  to find more options. You can find more details at:  [email or web address]     I agree that my provider, clinic care team, and pharmacy may work with any city, state or federal law enforcement agency that investigates the misuse, sale, or other diversion of my controlled medicine. I will allow my provider to discuss my care with, or share a copy of, this agreement with any other treating provider, pharmacy or emergency room where I receive care.    I have read this agreement and have asked questions about anything I did not understand.    _______________________________________________________  Patient Signature - Irene Terrazas _____________________                   Date     _______________________________________________________  Provider Signature - Tara Elliott MD   _____________________                   Date     _______________________________________________________  Witness Signature (required if provider not present while patient signing)   _____________________                   Date

## 2022-06-07 NOTE — PROGRESS NOTES
Rebecca is a 52 year old who is being evaluated via a billable video visit.      How would you like to obtain your AVS? MyChart  If the video visit is dropped, the invitation should be resent by: Text to cell phone: [phone]  Will anyone else be joining your video visit? No         ____________________________    Virtual Visit - Video Encounter  [facility]  Family Medicine  Date of Service: 6/7/2022    Subjective:  Chief Complaint   Patient presents with     Anxiety     Depression      Anxiety  Works as a respiratory therapist at [employer]  Covid has been hard.  Lost a brother to complications of covid - at 40 years old.  Still fearful of covid coming back and having to do that again.  OK at work, finding it harder at home  Has access to employee assistance program at work - hasn't used yet.     Answers for HPI/ROS submitted by the patient on 6/7/2022  What is the reason for your visit today? : Medication  How many servings of fruits and vegetables do you eat daily?: 0-1  On average, how many sweetened beverages do you drink each day (Examples: soda, juice, sweet tea, etc.  Do NOT count diet or artificially sweetened beverages)?: 0  How many minutes a day do you exercise enough to make your heart beat faster?: 60 or more  How many days a week do you exercise enough to make your heart beat faster?: 6  How many days per week do you miss taking your medication?: 0        PHQ 6/7/2022   PHQ-9 Total Score 6   Q9: Thoughts of better off dead/self-harm past 2 weeks Not at all      GUTIERREZ-7 SCORE 6/7/2022   Total Score 5      Objective:      GENERAL: Healthy, alert and no distress  EYES: Eyes grossly normal to inspection.  No discharge or erythema, or obvious scleral/conjunctival abnormalities.  RESP: No audible wheeze, cough, or visible cyanosis.  No visible retractions or increased work of breathing.    SKIN: Visible skin clear. No significant rash, abnormal pigmentation or lesions.  NEURO:  Cranial nerves grossly intact.  Mentation and speech appropriate for age.  PSYCH: Mentation appears normal, affect normal/bright, judgement and insight intact, normal speech and appearance well-groomed.   No visits with results within 1 Week(s) from this visit.   Latest known visit with results is:   Office Visit on 02/14/2022   Component Date Value Ref Range Status     Cholesterol 02/14/2022 158  <=199 mg/dL Final     Triglycerides 02/14/2022 55  <=149 mg/dL Final     Direct Measure HDL 02/14/2022 66  >=50 mg/dL Final    HDL Cholesterol Reference Range:     0-2 years:   No reference ranges established for patients under 2 years old  at Shortlist for lipid analytes.    2-8 years:  Greater than 45 mg/dL     18 years and older:   Female: Greater than or equal to 50 mg/dL   Male:   Greater than or equal to 40 mg/dL     LDL Cholesterol Calculated 02/14/2022 81  <=129 mg/dL Final     Patient Fasting > 8hrs? 02/14/2022 Yes   Final     No results found.   Assessment & Plan:  1. Adjustment disorder with mixed anxiety and depression.  -increase Effexor-XR to 150 mg daily  -see therapist for 6 months (through employee assistance program), then as needed  -use hydroxyzine (Vistaril) for sleep  -use lorazepam as needed for breakthrough panic/uncontrolled anxiety  -complete controlled substance agreement.    Order Summary                                                      Irene was seen today for anxiety and depression.    Diagnoses and all orders for this visit:    Adjustment disorder with mixed anxiety and depressed mood    Type 2 diabetes mellitus with microalbuminuria, unspecified whether long term insulin use (H)  -     HEMOGLOBIN A1C; Future  -     COVID-19,PF,MODERNA (18+ YRS BOOSTER .25ML); Future  -     Adult Eye Referral; Future    Visit for screening mammogram  -     MA SCREENING DIGITAL BILAT - Future  (s+30); Future    Panic Disorder With Agoraphobia  -     LORazepam (ATIVAN) 1 MG tablet; Take 1  tablet (1 mg) by mouth every 8 hours as needed for anxiety  -     hydrOXYzine (VISTARIL) 50 MG capsule; Take 1 capsule (50 mg) by mouth nightly as needed (Insomnia)    Generalized anxiety disorder  -     venlafaxine (EFFEXOR XR) 150 MG 24 hr capsule; Take 1 capsule (150 mg) by mouth daily  -     LORazepam (ATIVAN) 1 MG tablet; Take 1 tablet (1 mg) by mouth every 8 hours as needed for anxiety  -     hydrOXYzine (VISTARIL) 50 MG capsule; Take 1 capsule (50 mg) by mouth nightly as needed (Insomnia)    Agoraphobia with panic disorder  -     LORazepam (ATIVAN) 1 MG tablet; Take 1 tablet (1 mg) by mouth every 8 hours as needed for anxiety  -     hydrOXYzine (VISTARIL) 50 MG capsule; Take 1 capsule (50 mg) by mouth nightly as needed (Insomnia)        No future appointments.    Completed by: Tara Elliott M.D., [facility]. 6/7/2022 12:46 PM.  This transcription uses voice recognition software, which may contain typographical errors.  ____________________________  Start visit: 12:46 PM  End visit: 12:59 PM       Video-Visit Details    Type of service:  Video Visit    Video End Time:12:59 PM    Originating Location (pt. Location): Home    Distant Location (provider location):  [facility]     Platform used for Video Visit: [email or web address]

## 2022-06-13 ENCOUNTER — HOSPITAL ENCOUNTER (OUTPATIENT)
Dept: MAMMOGRAPHY | Facility: CLINIC | Age: 52
Discharge: HOME | End: 2022-06-13
Attending: FAMILY MEDICINE | Admitting: FAMILY MEDICINE
Payer: COMMERCIAL

## 2022-06-13 DIAGNOSIS — Z12.31 VISIT FOR SCREENING MAMMOGRAM: ICD-10-CM

## 2022-06-13 PROCEDURE — 77067 SCR MAMMO BI INCL CAD: CPT

## 2022-06-30 ENCOUNTER — TRANSFERRED RECORDS (OUTPATIENT)
Dept: HEALTH INFORMATION MANAGEMENT | Facility: CLINIC | Age: 52
End: 2022-06-30

## 2022-06-30 LAB
RETINOPATHY: NEGATIVE
RETINOPATHY: NORMAL
RETINOPATHY: NORMAL

## 2022-07-06 DIAGNOSIS — F40.01 AGORAPHOBIA WITH PANIC DISORDER: ICD-10-CM

## 2022-07-06 DIAGNOSIS — F41.1 GENERALIZED ANXIETY DISORDER: ICD-10-CM

## 2022-07-07 RX ORDER — HYDROXYZINE PAMOATE 50 MG/1
50 CAPSULE ORAL
Qty: 30 CAPSULE | Refills: 6 | OUTPATIENT
Start: 2022-07-07

## 2022-07-17 ENCOUNTER — HEALTH MAINTENANCE LETTER (OUTPATIENT)
Age: 52
End: 2022-07-17

## 2022-08-09 DIAGNOSIS — E11.29 TYPE 2 DIABETES MELLITUS WITH MICROALBUMINURIA, WITHOUT LONG-TERM CURRENT USE OF INSULIN (H): ICD-10-CM

## 2022-08-09 DIAGNOSIS — R80.9 TYPE 2 DIABETES MELLITUS WITH MICROALBUMINURIA, WITHOUT LONG-TERM CURRENT USE OF INSULIN (H): ICD-10-CM

## 2022-08-10 RX ORDER — LISINOPRIL 5 MG/1
5 TABLET ORAL DAILY
Qty: 90 TABLET | Refills: 3 | Status: SHIPPED | OUTPATIENT
Start: 2022-08-10 | End: 2023-05-19

## 2022-08-10 NOTE — TELEPHONE ENCOUNTER
"Routing refill request to provider for review/approval because:  Labs not current:  Multiple    Last Written Prescription Date:  12/29/21  Last Fill Quantity: 90,  # refills: 1   Last office visit provider:  6/7/22     Requested Prescriptions   Pending Prescriptions Disp Refills     lisinopril (ZESTRIL) 5 MG tablet [Pharmacy Med Name: LISINOPRIL 5 MG TABLET] 90 tablet 1     Sig: TAKE 1 TABLET BY MOUTH EVERY DAY       ACE Inhibitors (Including Combos) Protocol Failed - 8/10/2022  8:08 AM        Failed - Normal serum creatinine on file in past 12 months     Recent Labs   Lab Test 07/16/21  0745   CR 0.78       Ok to refill medication if creatinine is low          Failed - Normal serum potassium on file in past 12 months     Recent Labs   Lab Test 07/16/21  0745   POTASSIUM 4.6             Passed - Blood pressure under 140/90 in past 12 months     BP Readings from Last 3 Encounters:   02/14/22 110/67   07/16/21 100/60   11/12/20 108/67                 Passed - Recent (12 mo) or future (30 days) visit within the authorizing provider's specialty     Patient has had an office visit with the authorizing provider or a provider within the authorizing providers department within the previous 12 mos or has a future within next 30 days. See \"Patient Info\" tab in inbasket, or \"Choose Columns\" in Meds & Orders section of the refill encounter.              Passed - Medication is active on med list        Passed - Patient is age 18 or older        Passed - No active pregnancy on record        Passed - No positive pregnancy test within past 12 months             Hayden Tam RN 08/10/22 8:08 AM  "

## 2022-09-02 DIAGNOSIS — R80.9 TYPE 2 DIABETES MELLITUS WITH MICROALBUMINURIA, WITHOUT LONG-TERM CURRENT USE OF INSULIN (H): Primary | ICD-10-CM

## 2022-09-02 DIAGNOSIS — E78.5 HYPERLIPIDEMIA, UNSPECIFIED HYPERLIPIDEMIA TYPE: ICD-10-CM

## 2022-09-02 DIAGNOSIS — E11.29 TYPE 2 DIABETES MELLITUS WITH MICROALBUMINURIA, WITHOUT LONG-TERM CURRENT USE OF INSULIN (H): Primary | ICD-10-CM

## 2022-09-02 NOTE — TELEPHONE ENCOUNTER
"Routing refill request to provider for review/approval because:  Labs not current:  a1c  cr    Last Written Prescription Date:  9/14/21  Last Fill Quantity: 4.5,  # refills: 3   Last office visit provider:  6/7/22     Requested Prescriptions   Pending Prescriptions Disp Refills     OZEMPIC (0.25 OR 0.5 MG/DOSE) 2 MG/1.5ML SOPN pen [Pharmacy Med Name: OZEMPIC 0.25-0.5 MG/DOSE PEN]  3     Sig: INJECT 0.5 MG UNDER THE SKIN ONCE A WEEK.       GLP-1 Agonists Protocol Failed - 9/2/2022  2:29 AM        Failed - HgbA1C in past 3 or 6 months     If HgbA1C is 8 or greater, it needs to be on file within the past 3 months.  If less than 8, must be on file within the past 6 months.     Recent Labs   Lab Test 12/13/21  1004   A1C 5.6             Failed - Normal serum creatinine on file in past 12 months     Recent Labs   Lab Test 07/16/21  0745   CR 0.78       Ok to refill medication if creatinine is low          Passed - Medication is active on med list        Passed - Patient is age 18 or older        Passed - No active pregnancy on record        Passed - No positive pregnancy test in past 12 months        Passed - Recent (6 mo) or future (30 days) visit within the authorizing provider's specialty     Patient had office visit in the last 6 months or has a visit in the next 30 days with authorizing provider.  See \"Patient Info\" tab in inbasket, or \"Choose Columns\" in Meds & Orders section of the refill encounter.                 Joyce You RN 09/02/22 5:09 PM  "

## 2022-09-06 DIAGNOSIS — R80.9 TYPE 2 DIABETES MELLITUS WITH MICROALBUMINURIA, WITHOUT LONG-TERM CURRENT USE OF INSULIN (H): ICD-10-CM

## 2022-09-06 DIAGNOSIS — K59.09 CHRONIC CONSTIPATION: ICD-10-CM

## 2022-09-06 DIAGNOSIS — K58.1 IRRITABLE BOWEL SYNDROME WITH CONSTIPATION: ICD-10-CM

## 2022-09-06 DIAGNOSIS — F41.1 GENERALIZED ANXIETY DISORDER: ICD-10-CM

## 2022-09-06 DIAGNOSIS — E11.29 TYPE 2 DIABETES MELLITUS WITH MICROALBUMINURIA, WITHOUT LONG-TERM CURRENT USE OF INSULIN (H): ICD-10-CM

## 2022-09-06 RX ORDER — PLECANATIDE 3 MG/1
TABLET ORAL
Qty: 30 TABLET | Refills: 3 | Status: SHIPPED | OUTPATIENT
Start: 2022-09-06 | End: 2023-03-27

## 2022-09-06 RX ORDER — VENLAFAXINE HYDROCHLORIDE 75 MG/1
CAPSULE, EXTENDED RELEASE ORAL
Qty: 90 CAPSULE | Refills: 1 | Status: SHIPPED | OUTPATIENT
Start: 2022-09-06 | End: 2023-06-22

## 2022-09-06 RX ORDER — SEMAGLUTIDE 1.34 MG/ML
0.5 INJECTION, SOLUTION SUBCUTANEOUS
Qty: 5 ML | Refills: 3 | Status: SHIPPED | OUTPATIENT
Start: 2022-09-06 | End: 2023-03-06 | Stop reason: DRUGHIGH

## 2022-09-06 NOTE — TELEPHONE ENCOUNTER
Due for labs. Please schedule lab only.  If she hasn't scheduled her eye exam, please ask her to do so.    No future appointments.   Health Maintenance Due   Topic Date Due     Pneumococcal Vaccine: Pediatrics (0 to 5 Years) and At-Risk Patients (6 to 64 Years) (2 - PCV) 01/03/2009     EYE EXAM  05/02/2020     COVID-19 Vaccine (4 - Booster for Pfizer series) 03/29/2022     DIABETIC FOOT EXAM  05/20/2022     A1C  06/13/2022     BMP  07/16/2022     INFLUENZA VACCINE (1) 09/01/2022     BP Readings from Last 3 Encounters:   02/14/22 110/67   07/16/21 100/60   11/12/20 108/67     Irene was seen today for medication refill.    Diagnoses and all orders for this visit:    Hyperlipidemia, unspecified hyperlipidemia type  -     Lipid panel reflex to direct LDL Fasting; Future    Type 2 diabetes mellitus with microalbuminuria, without long-term current use of insulin (H)  -     semaglutide (OZEMPIC, 0.25 OR 0.5 MG/DOSE,) 2 MG/1.5ML SOPN pen; Inject 0.5 mg Subcutaneous every 7 days  -     Hemoglobin A1c; Future  -     Comprehensive metabolic panel; Future  -     TSH with free T4 reflex; Future

## 2022-09-06 NOTE — TELEPHONE ENCOUNTER
"Routing refill request to provider for review/approval because:  Drug not on the [facility] ref[place] protocol   Labs not current:  Cr a1c    Last Written Prescription Date:  3/11/22  Last Fill Quantity: 180,  # refills: 1   Last office visit provider:  6/7/22     Requested Prescriptions   Pending Prescriptions Disp Refills     TRULANCE 3 MG tablet [Pharmacy Med Name: TRULANCE 3 MG TABLET] 30 tablet 3     Sig: TAKE 1 TABLET BY MOUTH EVERY DAY       There is no refill protocol information for this order        venlafaxine (EFFEXOR XR) 75 MG 24 hr capsule [Pharmacy Med Name: VENLAFAXINE HCL ER 75 MG CAP] 90 capsule 1     Sig: TAKE 1 CAPSULE BY MOUTH EVERY DAY       Serotonin-Norepinephrine Reuptake Inhibitors  Failed - 9/6/2022  9:50 AM        Failed - Normal serum creatinine on file in past 12 months     Recent Labs   Lab Test 07/16/21  0745   CR 0.78       Ok to refill medication if creatinine is low          Passed - Blood pressure under 140/90 in past 12 months     BP Readings from Last 3 Encounters:   02/14/22 110/67   07/16/21 100/60   11/12/20 108/67                 Passed - Recent (12 mo) or future (30 days) visit within the authorizing provider's specialty     Patient has had an office visit with the authorizing provider or a provider within the authorizing providers department within the previous 12 mos or has a future within next 30 days. See \"Patient Info\" tab in inbasket, or \"Choose Columns\" in Meds & Orders section of the refill encounter.              Passed - Medication is active on med list        Passed - Patient is age 18 or older        Passed - No active pregnancy on record        Passed - No positive pregnancy test in past 12 months           metFORMIN (GLUCOPHAGE) 1000 MG tablet [Pharmacy Med Name: METFORMIN HCL 1,000 MG TABLET] 180 tablet 1     Sig: TAKE 1 TABLET BY MOUTH TWICE A DAY WITH MEALS       Biguanide Agents Failed - 9/6/2022  9:50 AM        Failed - Patient has documented A1c within the " "specified period of time.     If HgbA1C is 8 or greater, it needs to be on file within the past 3 months.  If less than 8, must be on file within the past 6 months.     Recent Labs   Lab Test 12/13/21  1004   A1C 5.6             Failed - Patient's CR is NOT>1.4 OR Patient's EGFR is NOT<45 within past 12 mos.     Recent Labs   Lab Test 07/16/21  0745 12/21/20  0833   GFRESTIMATED 88 >60   GFRESTBLACK  --  >60       Recent Labs   Lab Test 07/16/21  0745   CR 0.78             Passed - Patient is age 10 or older        Passed - Patient does NOT have a diagnosis of CHF.        Passed - Medication is active on med list        Passed - Patient is not pregnant        Passed - Patient has not had a positive pregnancy test within the past 12 mos.         Passed - Recent (6 mo) or future (30 days) visit within the authorizing provider's specialty     Patient had office visit in the last 6 months or has a visit in the next 30 days with authorizing provider or within the authorizing provider's specialty.  See \"Patient Info\" tab in inbasket, or \"Choose Columns\" in Meds & Orders section of the refill encounter.                 Joyce You RN 09/06/22 5:18 PM  "

## 2022-09-25 ENCOUNTER — HEALTH MAINTENANCE LETTER (OUTPATIENT)
Age: 52
End: 2022-09-25

## 2022-10-12 ENCOUNTER — TRANSFERRED RECORDS (OUTPATIENT)
Dept: HEALTH INFORMATION MANAGEMENT | Facility: CLINIC | Age: 52
End: 2022-10-12

## 2022-10-29 ENCOUNTER — OFFICE VISIT (OUTPATIENT)
Dept: FAMILY MEDICINE | Facility: CLINIC | Age: 52
End: 2022-10-29
Payer: COMMERCIAL

## 2022-10-29 VITALS
RESPIRATION RATE: 18 BRPM | DIASTOLIC BLOOD PRESSURE: 78 MMHG | BODY MASS INDEX: 25.96 KG/M2 | WEIGHT: 156 LBS | SYSTOLIC BLOOD PRESSURE: 126 MMHG | TEMPERATURE: 99.4 F | HEART RATE: 108 BPM | OXYGEN SATURATION: 98 %

## 2022-10-29 DIAGNOSIS — N30.00 ACUTE CYSTITIS WITHOUT HEMATURIA: Primary | ICD-10-CM

## 2022-10-29 LAB
ALBUMIN UR-MCNC: >=300 MG/DL
APPEARANCE UR: CLEAR
BACTERIA #/AREA URNS HPF: ABNORMAL /HPF
BILIRUB UR QL STRIP: ABNORMAL
COLOR UR AUTO: ABNORMAL
GLUCOSE UR STRIP-MCNC: 500 MG/DL
HGB UR QL STRIP: ABNORMAL
KETONES UR STRIP-MCNC: 15 MG/DL
LEUKOCYTE ESTERASE UR QL STRIP: ABNORMAL
NITRATE UR QL: POSITIVE
PH UR STRIP: 5 [PH] (ref 5–8)
RBC #/AREA URNS AUTO: ABNORMAL /HPF
SP GR UR STRIP: 1.02 (ref 1–1.03)
SQUAMOUS #/AREA URNS AUTO: ABNORMAL /LPF
UROBILINOGEN UR STRIP-ACNC: >=8 E.U./DL
WBC #/AREA URNS AUTO: ABNORMAL /HPF

## 2022-10-29 PROCEDURE — 87086 URINE CULTURE/COLONY COUNT: CPT | Performed by: STUDENT IN AN ORGANIZED HEALTH CARE EDUCATION/TRAINING PROGRAM

## 2022-10-29 PROCEDURE — 99213 OFFICE O/P EST LOW 20 MIN: CPT | Performed by: STUDENT IN AN ORGANIZED HEALTH CARE EDUCATION/TRAINING PROGRAM

## 2022-10-29 PROCEDURE — 81001 URINALYSIS AUTO W/SCOPE: CPT | Performed by: STUDENT IN AN ORGANIZED HEALTH CARE EDUCATION/TRAINING PROGRAM

## 2022-10-29 RX ORDER — SULFAMETHOXAZOLE/TRIMETHOPRIM 800-160 MG
1 TABLET ORAL 2 TIMES DAILY
Qty: 10 TABLET | Refills: 0 | Status: SHIPPED | OUTPATIENT
Start: 2022-10-29 | End: 2022-12-27

## 2022-10-29 NOTE — PROGRESS NOTES
"Assessment & Plan     Acute cystitis without hematuria  History, physical exam, UA are most consistent with UTI.  She especially has leukocyte esterase, nitrates in her urine today.  Discussed with her empiric treatment with Bactrim, and awaiting urine culture results in case the Bactrim does not cover the bacteria.  She does not have any worrisome symptoms that would be concerning for pyelonephritis or other systemic/septic findings at this time.  - UA Macro with Reflex to Micro and Culture - lab collect; Future  - UA Macro with Reflex to Micro and Culture - lab collect  - Urine Microscopic Exam  - Urine Culture  - sulfamethoxazole-trimethoprim (BACTRIM DS) 800-160 MG tablet; Take 1 tablet by mouth 2 times daily    Review of the result(s) of each unique test - previous UA, present UA, UC pending  20 minutes spent on the date of the encounter doing chart review, history and exam, documentation and further activities per the note       Tessa Paula MD PGY3  [facility]   Irene Terrazas is a 52 year old who presents for the following health issues    HPI     2-day history of dysuria, increased frequency.  Patient says that she has had history of UTI, and symptoms feel pretty similar.  She also feels as though her lower abdominal area is \"spasming\".  She decided to take Azo 1 day prior to presentation today.  She has occasional fever; however, she also mentions that she has been having some URI symptoms as well.  Denies any flank pain, nausea, vomiting, diarrhea.    Review of Systems   Complete ROS normal aside noted in HPI      Objective    /78   Pulse 108   Temp 99.4  F (37.4  C) (Oral)   Resp 18   Wt 70.8 kg (156 lb)   SpO2 98%   BMI 25.96 kg/m    Body mass index is 25.96 kg/m .    Physical Exam   GENERAL: healthy, alert and no distress  NECK: no adenopathy, no asymmetry, masses, or scars and thyroid normal to palpation  RESP: lungs clear to " auscultation - no rales, rhonchi or wheezes  CV: regular rate and rhythm, normal S1 S2, no S3 or S4, no murmur, click or rub, no peripheral edema and peripheral pulses strong  ABDOMEN: soft, suprapubic TTP, no hepatosplenomegaly, no masses and bowel sounds normal  MS: no gross musculoskeletal defects noted, no edema    Results for orders placed or performed in visit on 10/29/22 (from the past 24 hour(s))   UA Macro with Reflex to Micro and Culture - lab collect    Specimen: Urine, Midstream   Result Value Ref Range    Color Urine Orange (A) Colorless, Straw, Light Yellow, Yellow    Appearance Urine Clear Clear    Glucose Urine 500 (A) Negative, 1000 , >=2000 mg/dL    Bilirubin Urine Moderate (A) Negative    Ketones Urine 15 (A) Negative, 160  mg/dL    Specific Gravity Urine 1.020 1.005 - 1.030    Blood Urine Moderate (A) Negative    pH Urine 5.0 5.0 - 8.0    Protein Albumin Urine >=300 (A) Negative, 300 , >=2000 mg/dL    Urobilinogen Urine >=8.0 (A) 0.2, 1.0 E.U./dL    Nitrite Urine Positive (A) Negative    Leukocyte Esterase Urine Large (A) Negative   Urine Microscopic Exam   Result Value Ref Range    Bacteria Urine Moderate (A) None Seen /HPF    RBC Urine 10-25 (A) 0-2 /HPF /HPF    WBC Urine 25-50 (A) 0-5 /HPF /HPF    Squamous Epithelials Urine Few (A) None Seen /LPF     UC pending    ----- Service Performed and Documented by Resident or Fellow ------

## 2022-10-29 NOTE — LETTER
October 29, 2022      Irene Terrazas  [address] [facility] [address]        To Whom It May Concern:    Irene Terrazas  was seen on 10/29/22.  Please excuse her  until 11/1/22 due to illness.        Sincerely,        Tessa Paula MD

## 2022-10-31 LAB — BACTERIA UR CULT: NO GROWTH

## 2022-11-21 ENCOUNTER — MYC MEDICAL ADVICE (OUTPATIENT)
Dept: FAMILY MEDICINE | Facility: CLINIC | Age: 52
End: 2022-11-21

## 2022-11-21 DIAGNOSIS — E11.29 TYPE 2 DIABETES MELLITUS WITH MICROALBUMINURIA, UNSPECIFIED WHETHER LONG TERM INSULIN USE: Primary | ICD-10-CM

## 2022-11-21 DIAGNOSIS — R80.9 TYPE 2 DIABETES MELLITUS WITH MICROALBUMINURIA, UNSPECIFIED WHETHER LONG TERM INSULIN USE: Primary | ICD-10-CM

## 2022-11-23 NOTE — TELEPHONE ENCOUNTER
Writer replied to patient via [employer] message.    SEUN TerrellN, RN   [facility]

## 2022-11-25 NOTE — TELEPHONE ENCOUNTER
Writer called and spoke to Moo, from Dr. Carpenter's office. Told Moo PCP is requesting a copy of the Eye Exam done there. [facility] fax number [phone] given to Moo.    Moo states she will sent over fax shortly.    Closing encounter.    SEUN TerrellN, RN   [facility]

## 2022-12-01 ENCOUNTER — MYC REFILL (OUTPATIENT)
Dept: FAMILY MEDICINE | Facility: CLINIC | Age: 52
End: 2022-12-01

## 2022-12-01 DIAGNOSIS — Z79.899 CHRONIC PRESCRIPTION BENZODIAZEPINE USE: Primary | ICD-10-CM

## 2022-12-01 DIAGNOSIS — F40.01 AGORAPHOBIA WITH PANIC DISORDER: ICD-10-CM

## 2022-12-01 DIAGNOSIS — F41.1 GENERALIZED ANXIETY DISORDER: ICD-10-CM

## 2022-12-02 PROBLEM — Z79.899 CHRONIC PRESCRIPTION BENZODIAZEPINE USE: Status: ACTIVE | Noted: 2022-12-02

## 2022-12-02 RX ORDER — LORAZEPAM 1 MG/1
1 TABLET ORAL EVERY 8 HOURS PRN
Qty: 20 TABLET | Refills: 0 | Status: SHIPPED | OUTPATIENT
Start: 2022-12-02 | End: 2023-12-28

## 2022-12-02 NOTE — TELEPHONE ENCOUNTER
Due for drug screen, GAD7, and controlled substance agreement.  -have her come in for lab only for drug screen  -have her do GAD7 and controlled substance agreement on mychart    Future Appointments   Date Time Provider Department Center   12/27/2022 10:20 AM Tara Elliott MD [facility]      Health Maintenance Due   Topic Date Due     Pneumococcal Vaccine: Pediatrics (0 to 5 Years) and At-Risk Patients (6 to 64 Years) (2 - PCV) 01/03/2009     EYE EXAM  05/02/2020     DIABETIC FOOT EXAM  05/20/2022     A1C  06/13/2022     BMP  07/16/2022     ANNUAL REVIEW OF HM ORDERS  07/16/2022     MICROALBUMIN  12/13/2022     Irene was seen today for refill request.    Diagnoses and all orders for this visit:    Chronic prescription benzodiazepine use  -     LORazepam (ATIVAN) 1 MG tablet; Take 1 tablet (1 mg) by mouth every 8 hours as needed for anxiety  -     Drug Confirmation Panel Urine with Creat - lab collect; Future    Generalized anxiety disorder  -     LORazepam (ATIVAN) 1 MG tablet; Take 1 tablet (1 mg) by mouth every 8 hours as needed for anxiety    Agoraphobia with panic disorder  -     LORazepam (ATIVAN) 1 MG tablet; Take 1 tablet (1 mg) by mouth every 8 hours as needed for anxiety    Last visit: 2/14/2022.   Future Appointments   Date Time Provider Department Center   12/27/2022 10:20 AM Tara Elliott MD [facility]     Controlled Substance Agreement: NOT on file.  Last Drug Screen:  Lab Results   Component Value Date    UAMP Screen Negative 10/29/2018    UBENZD Screen Negative 10/29/2018    OPIT Screen Negative 10/29/2018    UPCP Screen Negative 10/29/2018    UCANN Screen Negative 10/29/2018    UBARB Screen Negative 10/29/2018    UCOC Screen Negative 10/29/2018    OXYT Screen Negative 10/29/2018    UCRR 111 12/13/2021      DIRE Score (if applicable): Not applicable.    [place]  Review:

## 2022-12-27 ENCOUNTER — OFFICE VISIT (OUTPATIENT)
Dept: FAMILY MEDICINE | Facility: CLINIC | Age: 52
End: 2022-12-27
Payer: COMMERCIAL

## 2022-12-27 VITALS
TEMPERATURE: 97.2 F | BODY MASS INDEX: 25.83 KG/M2 | HEART RATE: 71 BPM | OXYGEN SATURATION: 100 % | SYSTOLIC BLOOD PRESSURE: 102 MMHG | WEIGHT: 155 LBS | HEIGHT: 65 IN | DIASTOLIC BLOOD PRESSURE: 66 MMHG

## 2022-12-27 DIAGNOSIS — S49.92XA SHOULDER INJURY, LEFT, INITIAL ENCOUNTER: ICD-10-CM

## 2022-12-27 DIAGNOSIS — R80.9 TYPE 2 DIABETES MELLITUS WITH MICROALBUMINURIA, UNSPECIFIED WHETHER LONG TERM INSULIN USE: ICD-10-CM

## 2022-12-27 DIAGNOSIS — M67.819 TENDINOSIS OF ROTATOR CUFF: ICD-10-CM

## 2022-12-27 DIAGNOSIS — Z00.00 ROUTINE GENERAL MEDICAL EXAMINATION AT A HEALTH CARE FACILITY: Primary | ICD-10-CM

## 2022-12-27 DIAGNOSIS — E11.29 TYPE 2 DIABETES MELLITUS WITH MICROALBUMINURIA, UNSPECIFIED WHETHER LONG TERM INSULIN USE: ICD-10-CM

## 2022-12-27 DIAGNOSIS — Z79.899 CHRONIC PRESCRIPTION BENZODIAZEPINE USE: ICD-10-CM

## 2022-12-27 DIAGNOSIS — M75.52 SUBACROMIAL BURSITIS OF LEFT SHOULDER JOINT: ICD-10-CM

## 2022-12-27 LAB
ALBUMIN SERPL BCG-MCNC: 4.4 G/DL (ref 3.5–5.2)
ALP SERPL-CCNC: 39 U/L (ref 35–104)
ALT SERPL W P-5'-P-CCNC: 14 U/L (ref 10–35)
ANION GAP SERPL CALCULATED.3IONS-SCNC: 11 MMOL/L (ref 7–15)
AST SERPL W P-5'-P-CCNC: 19 U/L (ref 10–35)
BILIRUB SERPL-MCNC: 0.5 MG/DL
BUN SERPL-MCNC: 10.2 MG/DL (ref 6–20)
CALCIUM SERPL-MCNC: 9.4 MG/DL (ref 8.6–10)
CHLORIDE SERPL-SCNC: 101 MMOL/L (ref 98–107)
CREAT SERPL-MCNC: 0.83 MG/DL (ref 0.51–0.95)
CREAT UR-MCNC: 110 MG/DL
CREAT UR-MCNC: 110 MG/DL
DEPRECATED HCO3 PLAS-SCNC: 26 MMOL/L (ref 22–29)
FSH SERPL IRP2-ACNC: 97.8 MIU/ML
GFR SERPL CREATININE-BSD FRML MDRD: 84 ML/MIN/1.73M2
GLUCOSE SERPL-MCNC: 86 MG/DL (ref 70–99)
HBA1C MFR BLD: 5.6 % (ref 0–5.6)
MICROALBUMIN UR-MCNC: <12 MG/L
MICROALBUMIN/CREAT UR: NORMAL MG/G{CREAT}
POTASSIUM SERPL-SCNC: 4.4 MMOL/L (ref 3.4–5.3)
PROT SERPL-MCNC: 7.2 G/DL (ref 6.4–8.3)
SODIUM SERPL-SCNC: 138 MMOL/L (ref 136–145)

## 2022-12-27 PROCEDURE — 80307 DRUG TEST PRSMV CHEM ANLYZR: CPT | Performed by: FAMILY MEDICINE

## 2022-12-27 PROCEDURE — 80053 COMPREHEN METABOLIC PANEL: CPT | Performed by: FAMILY MEDICINE

## 2022-12-27 PROCEDURE — 90677 PCV20 VACCINE IM: CPT | Performed by: FAMILY MEDICINE

## 2022-12-27 PROCEDURE — 99396 PREV VISIT EST AGE 40-64: CPT | Mod: 25 | Performed by: FAMILY MEDICINE

## 2022-12-27 PROCEDURE — 82043 UR ALBUMIN QUANTITATIVE: CPT | Performed by: FAMILY MEDICINE

## 2022-12-27 PROCEDURE — 83001 ASSAY OF GONADOTROPIN (FSH): CPT | Performed by: FAMILY MEDICINE

## 2022-12-27 PROCEDURE — 83036 HEMOGLOBIN GLYCOSYLATED A1C: CPT | Performed by: FAMILY MEDICINE

## 2022-12-27 PROCEDURE — 90471 IMMUNIZATION ADMIN: CPT | Performed by: FAMILY MEDICINE

## 2022-12-27 PROCEDURE — 82570 ASSAY OF URINE CREATININE: CPT | Mod: 59 | Performed by: FAMILY MEDICINE

## 2022-12-27 PROCEDURE — 99214 OFFICE O/P EST MOD 30 MIN: CPT | Mod: 25 | Performed by: FAMILY MEDICINE

## 2022-12-27 PROCEDURE — 36415 COLL VENOUS BLD VENIPUNCTURE: CPT | Performed by: FAMILY MEDICINE

## 2022-12-27 RX ORDER — FLUOROURACIL 50 MG/G
CREAM TOPICAL
COMMUNITY
Start: 2022-10-12

## 2022-12-27 RX ORDER — FLASH GLUCOSE SCANNING READER
EACH MISCELLANEOUS
Qty: 1 EACH | Refills: 0 | Status: SHIPPED | OUTPATIENT
Start: 2022-12-27 | End: 2024-05-21

## 2022-12-27 RX ORDER — FLASH GLUCOSE SENSOR
KIT MISCELLANEOUS
Qty: 6 EACH | Refills: 4 | Status: SHIPPED | OUTPATIENT
Start: 2022-12-27 | End: 2024-05-21

## 2022-12-27 ASSESSMENT — ENCOUNTER SYMPTOMS
ARTHRALGIAS: 0
FREQUENCY: 0
HEMATOCHEZIA: 0
DIARRHEA: 0
MYALGIAS: 1
WEAKNESS: 0
HEARTBURN: 0
PARESTHESIAS: 0
EYE PAIN: 0
HEADACHES: 0
ABDOMINAL PAIN: 0
SHORTNESS OF BREATH: 0
FEVER: 0
HEMATURIA: 0
DYSURIA: 0
COUGH: 0
JOINT SWELLING: 0
PALPITATIONS: 0
CHILLS: 0
NERVOUS/ANXIOUS: 0
BREAST MASS: 0
CONSTIPATION: 0
SORE THROAT: 0
DIZZINESS: 0
NAUSEA: 0

## 2022-12-27 NOTE — PROGRESS NOTES
"   SUBJECTIVE:   CC: Rebceca is an 52 year old who presents for preventive health visit.   Patient has been advised of split billing requirements and indicates understanding: Yes  Healthy Habits:     Getting at least 3 servings of Calcium per day:  Yes    Bi-annual eye exam:  Yes    Dental care twice a year:  Yes    Sleep apnea or symptoms of sleep apnea:  None    Diet:  Regular (no restrictions)    Frequency of exercise:  4-5 days/week    Duration of exercise:  45-60 minutes    Taking medications regularly:  Yes    Medication side effects:  None    PHQ-2 Total Score: 0    Additional concerns today:  Yes    Left Shoulder  [facility]  Was walking dog, the dog pulled and she fell onto the left shoulder  Now has persistent Pain + Immobility  Not getting better  Affecting function    Ability to successfully perform activities of daily living: Yes, no assistance needed  Home safety:  none identified   Hearing impairment: Yes, Need to ask people to speak up or repeat themselves.    Today's PHQ-2 Score:   PHQ-2 ( 1999 [employer]) 12/27/2022   Q1: Little interest or pleasure in doing things 0   Q2: Feeling down, depressed or hopeless 0   PHQ-2 Score 0   PHQ-2 Total Score (12-17 Years)- Positive if 3 or more points; Administer PHQ-A if positive -   Q1: Little interest or pleasure in doing things Not at all   Q2: Feeling down, depressed or hopeless Not at all   PHQ-2 Score 0     Social History     Tobacco Use     Smoking status: Never     Smokeless tobacco: Never   Substance Use Topics     Alcohol use: Yes     Comment: Alcoholic Drinks/day: \"once a month\"     If you drink alcohol do you typically have >3 drinks per day or >7 drinks per week? No    Alcohol Use 12/27/2022   Prescreen: >3 drinks/day or >7 drinks/week? No   Prescreen: >3 drinks/day or >7 drinks/week? -       Reviewed orders with patient.  Reviewed health maintenance and updated orders accordingly - Yes      Breast Cancer " "Screening:  Breast CA Risk Assessment (FHS-7) 2/14/2022   Do you have a family history of breast, colon, or ovarian cancer? No / Unknown     Pertinent mammograms are reviewed under the imaging tab.    History of abnormal Pap smear:      Reviewed and updated as needed this visit by clinical staff   Tobacco  Allergies  Meds  Problems  Med Hx  Surg Hx  Fam Hx          Reviewed and updated as needed this visit by Provider   Tobacco  Allergies  Meds  Problems  Med Hx  Surg Hx  Fam Hx             Review of Systems   Constitutional: Negative for chills and fever.   HENT: Negative for congestion, ear pain, hearing loss and sore throat.    Eyes: Negative for pain and visual disturbance.   Respiratory: Negative for cough and shortness of breath.    Cardiovascular: Negative for chest pain, palpitations and peripheral edema.   Gastrointestinal: Negative for abdominal pain, constipation, diarrhea, heartburn, hematochezia and nausea.   Breasts:  Negative for tenderness, breast mass and discharge.   Genitourinary: Negative for dysuria, frequency, genital sores, hematuria, pelvic pain, urgency, vaginal bleeding and vaginal discharge.   Musculoskeletal: Positive for myalgias. Negative for arthralgias and joint swelling.   Skin: Negative for rash.   Neurological: Negative for dizziness, weakness, headaches and paresthesias.   Psychiatric/Behavioral: Negative for mood changes. The patient is not nervous/anxious.           OBJECTIVE:   /66 (BP Location: Left arm, Patient Position: Sitting, Cuff Size: Adult Regular)   Pulse 71   Temp 97.2  F (36.2  C) (Temporal)   Ht 1.649 m (5' 4.92\")   Wt 70.3 kg (155 lb)   SpO2 100%   BMI 25.86 kg/m    Physical Exam  GENERAL: healthy, alert and no distress  EYES: Eyes grossly normal to inspection, PERRL and conjunctivae and sclerae normal  HENT: ear canals and TM's normal, nose and mouth without ulcers or lesions  NECK: no adenopathy, no asymmetry, masses, or scars and " thyroid normal to palpation  RESP: lungs clear to auscultation - no rales, rhonchi or wheezes  BREAST: normal without masses, tenderness or nipple discharge and no palpable axillary masses or adenopathy  CV: regular rate and rhythm, normal S1 S2, no S3 or S4, no murmur, click or rub, no peripheral edema and peripheral pulses strong  ABDOMEN: soft, nontender, no hepatosplenomegaly, no masses and bowel sounds normal  MS: Decreased ROM in left shoulder with pain with passive movement.   SKIN: no suspicious lesions or rashes  NEURO: Normal strength and tone, mentation intact and speech normal  PSYCH: mentation appears normal, affect normal/bright    Diagnostic Test Results:  Labs reviewed in Epic    ASSESSMENT/PLAN:   Irene was seen today for physical and left shoulder pain.    Diagnoses and all orders for this visit:    Routine general medical examination at a health care facility  -     Follicle stimulating hormone; Future  -     Follicle stimulating hormone    Type 2 diabetes mellitus with microalbuminuria, unspecified whether long term insulin use (H)  -     Adult Eye  Referral; Future  -     Albumin Random Urine Quantitative with Creat Ratio; Future  -     Hemoglobin A1c; Future  -     Comprehensive metabolic panel; Future  -     Albumin Random Urine Quantitative with Creat Ratio; Future  -     Albumin Random Urine Quantitative with Creat Ratio  -     Continuous Blood Gluc  (FREESTYLE DAPHNEY 14 DAY READER) KELTON; Use to read blood sugars as per 's instructions.  -     Continuous Blood Gluc Sensor (FREESTYLE DAPHNEY 14 DAY SENSOR) MISC; Change every 14 days.  -     blood glucose monitoring (NO BRAND SPECIFIED) meter device kit; Use to test blood sugar 1 times daily or as directed. Preferred blood glucose meter OR supplies to accompany: Blood Glucose Monitor Brands: per insurance.  -     blood glucose (NO BRAND SPECIFIED) test strip; Use to test blood sugar 1 times daily or as directed.  To accompany: Blood Glucose Monitor Brands: per insurance.  -     Hemoglobin A1c  -     Comprehensive metabolic panel    Chronic prescription benzodiazepine use  -     Drug Confirmation Panel Urine with Creat - lab collect    Shoulder injury, left, initial encounter  [facility] rot[facility]  -     MR Shoulder Left w/o Contrast; Future  -     XR Shoulder Left 2 Views; Future    Other orders  -     Pneumococcal 20 Valent Conjugate (Prevnar 20)  -     REVIEW OF HEALTH MAINTENANCE PROTOCOL ORDERS  -     REVIEW OF HEALTH MAINTENANCE PROTOCOL ORDERS              COUNSELING:  Reviewed preventive health counseling, as reflected in patient instructions        She reports that she has never smoked. She has never used smokeless tobacco.      Tara Elliott MD  [facility]

## 2022-12-29 DIAGNOSIS — E11.29 TYPE 2 DIABETES MELLITUS WITH MICROALBUMINURIA, UNSPECIFIED WHETHER LONG TERM INSULIN USE: Primary | ICD-10-CM

## 2022-12-29 DIAGNOSIS — R80.9 TYPE 2 DIABETES MELLITUS WITH MICROALBUMINURIA, UNSPECIFIED WHETHER LONG TERM INSULIN USE: Primary | ICD-10-CM

## 2022-12-29 LAB — LORAZEPAM UR QL CFM: PRESENT

## 2022-12-30 RX ORDER — FLASH GLUCOSE SENSOR
KIT MISCELLANEOUS
Qty: 2 EACH | Refills: 11 | Status: SHIPPED | OUTPATIENT
Start: 2022-12-30 | End: 2024-05-21

## 2022-12-30 NOTE — TELEPHONE ENCOUNTER
Routing refill request to provider for review/approval because:  Drug not on the [facility] refill protocol   Requesting early refill    Last Written Prescription Date:  12/27/22  Last Fill Quantity: 6,  # refills: 4   Last office visit provider:   12/27/22    Requested Prescriptions   Pending Prescriptions Disp Refills     Continuous Blood Gluc Sensor (FREESTYLE DAPHNEY 14 DAY SENSOR) MISC 2 each 11     Sig: Change every 14 days.       There is no refill protocol information for this order          Jennifer Sandhu RN 12/30/22 4:16 PM

## 2023-01-05 ENCOUNTER — HOSPITAL ENCOUNTER (OUTPATIENT)
Dept: MRI IMAGING | Facility: CLINIC | Age: 53
Discharge: HOME | End: 2023-01-05
Attending: FAMILY MEDICINE
Payer: COMMERCIAL

## 2023-01-05 ENCOUNTER — HOSPITAL ENCOUNTER (OUTPATIENT)
Dept: RADIOLOGY | Facility: CLINIC | Age: 53
Discharge: HOME | End: 2023-01-05
Attending: FAMILY MEDICINE
Payer: COMMERCIAL

## 2023-01-05 DIAGNOSIS — S49.92XA SHOULDER INJURY, LEFT, INITIAL ENCOUNTER: ICD-10-CM

## 2023-01-05 PROCEDURE — 73221 MRI JOINT UPR EXTREM W/O DYE: CPT | Mod: LT

## 2023-01-05 PROCEDURE — 73030 X-RAY EXAM OF SHOULDER: CPT | Mod: LT

## 2023-01-17 ENCOUNTER — MYC MEDICAL ADVICE (OUTPATIENT)
Dept: FAMILY MEDICINE | Facility: CLINIC | Age: 53
End: 2023-01-17
Payer: COMMERCIAL

## 2023-01-18 DIAGNOSIS — E11.29 TYPE 2 DIABETES MELLITUS WITH MICROALBUMINURIA, UNSPECIFIED WHETHER LONG TERM INSULIN USE: Primary | ICD-10-CM

## 2023-01-18 DIAGNOSIS — R80.9 TYPE 2 DIABETES MELLITUS WITH MICROALBUMINURIA, UNSPECIFIED WHETHER LONG TERM INSULIN USE: Primary | ICD-10-CM

## 2023-01-18 RX ORDER — BLOOD SUGAR DIAGNOSTIC
STRIP MISCELLANEOUS
Qty: 100 STRIP | Refills: 6 | Status: SHIPPED | OUTPATIENT
Start: 2023-01-18

## 2023-01-18 RX ORDER — LANCETS
EACH MISCELLANEOUS
Qty: 100 EACH | Refills: 4 | Status: SHIPPED | OUTPATIENT
Start: 2023-01-18

## 2023-01-18 RX ORDER — BLOOD-GLUCOSE METER
1 EACH MISCELLANEOUS PRN
Qty: 1 KIT | Refills: 0 | Status: SHIPPED | OUTPATIENT
Start: 2023-01-18 | End: 2023-12-20

## 2023-01-18 NOTE — TELEPHONE ENCOUNTER
RN attempted to contact patient, but no answer. Left message on patient's voice mail to call clinic back.    If patient calls back, please relay message below. Thanks  Micaela DM educator ordered patient's DM supplies as requested. However ,fast clicks are no longer available.  Micaela ordered soft clicks instead.    Taty Huang RN  [facility]

## 2023-01-19 NOTE — TELEPHONE ENCOUNTER
RN made 2nd attempt to call patient regarding her mychart message.      Patient did not answer. Left message to patient to call the clinic back.    If patient calls back, please relay message below :    Micaela DM educator ordered patient's DM supplies as requested. However ,fast clicks are no longer available. Micaela ordered soft clicks instead.            Luann Lange RN  [facility]

## 2023-01-20 NOTE — TELEPHONE ENCOUNTER
RN made 3rd attempted to contact patient, but no answer. Left detailed message on patient's voice mail to call clinic back.    Left message that Micaela DM educator ordered patient's DM supplies as requested. However ,fast clicks are no longer available.  Micaela ordered soft clicks instead.    No further action needed.    Taty Huang RN  [facility]

## 2023-02-16 DIAGNOSIS — E11.29 TYPE 2 DIABETES MELLITUS WITH MICROALBUMINURIA, WITHOUT LONG-TERM CURRENT USE OF INSULIN (H): ICD-10-CM

## 2023-02-16 DIAGNOSIS — E78.5 HYPERLIPIDEMIA, UNSPECIFIED HYPERLIPIDEMIA TYPE: ICD-10-CM

## 2023-02-16 DIAGNOSIS — R80.9 TYPE 2 DIABETES MELLITUS WITH MICROALBUMINURIA, WITHOUT LONG-TERM CURRENT USE OF INSULIN (H): ICD-10-CM

## 2023-02-18 NOTE — TELEPHONE ENCOUNTER
"Routing refill request to provider for review/approval because:  Labs not current:  ldl    Last Written Prescription Date:  5/27/22  Last Fill Quantity: 90,  # refills: 2   Last office visit provider:  12/27/22     Requested Prescriptions   Pending Prescriptions Disp Refills     simvastatin (ZOCOR) 20 MG tablet [Pharmacy Med Name: SIMVASTATIN 20 MG TABLET] 90 tablet 2     Sig: TAKE 1 TABLET BY MOUTH EVERYDAY AT BEDTIME       Statins Protocol Failed - 2/16/2023  6:44 PM        Failed - LDL on file in past 12 months     Recent Labs   Lab Test 02/14/22  0933   LDL 81             Passed - No abnormal creatine kinase in past 12 months     No lab results found.             Passed - Recent (12 mo) or future (30 days) visit within the authorizing provider's specialty     Patient has had an office visit with the authorizing provider or a provider within the authorizing providers department within the previous 12 mos or has a future within next 30 days. See \"Patient Info\" tab in inbasket, or \"Choose Columns\" in Meds & Orders section of the refill encounter.              Passed - Medication is active on med list        Passed - Patient is age 18 or older        Passed - No active pregnancy on record        Passed - No positive pregnancy test in past 12 months           metFORMIN (GLUCOPHAGE) 1000 MG tablet [Pharmacy Med Name: METFORMIN HCL 1,000 MG TABLET] 180 tablet 1     Sig: TAKE 1 TABLET BY MOUTH TWICE A DAY WITH MEALS       Biguanide Agents Passed - 2/16/2023  6:44 PM        Passed - Patient is age 10 or older        Passed - Patient has documented A1c within the specified period of time.     If HgbA1C is 8 or greater, it needs to be on file within the past 3 months.  If less than 8, must be on file within the past 6 months.     Recent Labs   Lab Test 12/27/22  1030   A1C 5.6             Passed - Patient's CR is NOT>1.4 OR Patient's EGFR is NOT<45 within past 12 mos.     Recent Labs   Lab Test 12/27/22  1030 " "07/16/21  0745 12/21/20  0833   GFRESTIMATED 84   < > >60   GFRESTBLACK  --   --  >60    < > = values in this interval not displayed.       Recent Labs   Lab Test 12/27/22  1030   CR 0.83             Passed - Patient does NOT have a diagnosis of CHF.        Passed - Medication is active on med list        Passed - Patient is not pregnant        Passed - Patient has not had a positive pregnancy test within the past 12 mos.         Passed - Recent (6 mo) or future (30 days) visit within the authorizing provider's specialty     Patient had office visit in the last 6 months or has a visit in the next 30 days with authorizing provider or within the authorizing provider's specialty.  See \"Patient Info\" tab in inbasket, or \"Choose Columns\" in Meds & Orders section of the refill encounter.                 Joyce You RN 02/18/23 9:03 AM  "

## 2023-02-21 RX ORDER — SIMVASTATIN 20 MG
20 TABLET ORAL DAILY
Qty: 90 TABLET | Refills: 4 | Status: SHIPPED | OUTPATIENT
Start: 2023-02-21 | End: 2023-12-28

## 2023-02-21 NOTE — TELEPHONE ENCOUNTER
Due for physical. Please schedule.     No future appointments.   Health Maintenance Due   Topic Date Due     DIABETIC FOOT EXAM  05/20/2022     PHQ-2 (once per calendar year)  01/01/2023     LIPID  02/14/2023     YEARLY PREVENTIVE VISIT  02/14/2023     BP Readings from Last 3 Encounters:   12/27/22 102/66   10/29/22 126/78   02/14/22 110/67     Irene was seen today for medication refill.    Diagnoses and all orders for this visit:    Type 2 diabetes mellitus with microalbuminuria, without long-term current use of insulin (H)  -     simvastatin (ZOCOR) 20 MG tablet; Take 1 tablet (20 mg) by mouth daily  -     metFORMIN (GLUCOPHAGE) 1000 MG tablet; TAKE 1 TABLET BY MOUTH TWICE A DAY WITH MEALS    Hyperlipidemia, unspecified hyperlipidemia type  -     simvastatin (ZOCOR) 20 MG tablet; Take 1 tablet (20 mg) by mouth daily

## 2023-02-28 ENCOUNTER — MYC MEDICAL ADVICE (OUTPATIENT)
Dept: FAMILY MEDICINE | Facility: CLINIC | Age: 53
End: 2023-02-28
Payer: COMMERCIAL

## 2023-02-28 DIAGNOSIS — R80.9 TYPE 2 DIABETES MELLITUS WITH MICROALBUMINURIA, WITHOUT LONG-TERM CURRENT USE OF INSULIN (H): Primary | ICD-10-CM

## 2023-02-28 DIAGNOSIS — E11.29 TYPE 2 DIABETES MELLITUS WITH MICROALBUMINURIA, WITHOUT LONG-TERM CURRENT USE OF INSULIN (H): Primary | ICD-10-CM

## 2023-03-06 NOTE — TELEPHONE ENCOUNTER
Diagnoses and all orders for this visit:    Type 2 diabetes mellitus with microalbuminuria, without long-term current use of insulin (H)  -     Semaglutide, 1 MG/DOSE, (OZEMPIC) 4 MG/3ML pen; Inject 1 mg Subcutaneous every 7 days        BMI Readings from Last 6 Encounters:   12/27/22 25.86 kg/m    10/29/22 25.96 kg/m    02/14/22 24.96 kg/m    07/16/21 24.63 kg/m    11/12/20 25.79 kg/m    09/24/20 26.13 kg/m

## 2023-03-21 NOTE — PROGRESS NOTES
Assessment:  Met with Irene today for the first time, history of dm noted and has been able to maintain bg and A1c control but recently both have been climbing. Struggles with wt, has found wt has been steadily increasing... Frustrated with this. History of GDM and insulin.  Works at [employer], good basic pathophysiology of dm.     Current dm medications:  Metformin  1000 mg two times a day  Glipizide 10 mg daily    A1c 7.7    Works day/nights which makes eating and exercise a challenge, states I never feel rested and find that I am always hungry.  Has had successful wt loss in the past but with strict regimens/programs through work, but finds they are not sustainable.   Interested in the [facility] wt mgmt program but has not heard back, was told she needed a referral.  Referral entered 10.29.18, called and left message directly with wt los program, to call patient back on cell phone and this writer as well to let me know they received message.    Long discussion regarding wt loss program vs starting an injectable today, Ozempic, GLP-1 agonist, to help with bg and wt mgmt.  Support decision to manage wt and bg through wt mgmt program, assess wt, bg, A1c progress.  Would prefer to start Ozempic today, wants to get this started.    Instructed on Ozempic pen, prime with first use, administration, storage and site rotation.  Patient took insulin while pregnant, familiar with pens and injections.  Demo d without difficulty.    Medication adjustments per protocol    Verbal order from  to start Ozempic .25 weekly  D/c glipizide    Brief review of cho..Instructed on basics of heart healthy eating using plate method, food models and serving dishes to illustrate cho foods, affect on bg and need for portion control and variety at meals. Instructed on cho counting and label reading for cho and serving size, devised meal plan with 2-3 cho per meal  Instructed on need for meal consistency with portion control, variety and  moderation. Receptive to education, verbalized understanding and motivated to implement needed changes to gain better control of bg and lose wt.    Plan:  Irene will contact CDE via my chart for one month follow up appt.     Subjective and Objective:      Irene Terrazas is referred by  for Diabetes Education.     Lab Results   Component Value Date    HGBA1C 7.7 (H) 10/29/2018         Current diabetes medications:    Metformin 1000 mg two times a day  Ozempic .25 weekly    Goals     None          Follow up:   CDE (certified diabetic educator)      Education:     Monitoring   Meter (per above goals): Competent  Monitoring: Assessed, Discussed and Literature provided  BG goals: Assessed, Discussed and Literature provided    Nutrition Management  Nutrition Management: Assessed, Discussed and Literature provided  Weight: Assessed  Portions/Balance: Assessed, Discussed and Literature provided  Carb ID/Count: Assessed, Discussed and Literature provided  Label Reading: Assessed, Discussed and Literature provided  Heart Healthy Fats: Discussed  Menu Planning: Assessed and Discussed  Dining Out: Not addressed  Physical Activity: Needs instruction/review at follow-up  Medications: Assessed and Discussed  Orals: Discussed and Competent  Injected Medications: Assessed, Discussed and Literature provided   Storage/Exp:Competent   Site Rotation: Competent     Diabetes Disease Process: Discussed    Acute Complications: Prevent, Detect, Treat:  Hypoglycemia: Assessed  Hyperglycemia: Assessed and Literature provided  Sick Days: Not addressed    Chronic Complications  Foot Care:Not addressed  Skin Care: Not addressed  Eye: Not addressed  ABC: Assessed and Discussed  Teeth:Not addressed  Goal Setting and Problem Solving: Discussed  Barriers: Assessed  Psychosocial Adjustments: Assessed      Time spent with the patient: 60 minutes for diabetes education and counseling.   Previous Education: yes  Visit Type:WON Carpenter   Gary  11/20/2018              No

## 2023-03-29 ENCOUNTER — TELEPHONE (OUTPATIENT)
Dept: FAMILY MEDICINE | Facility: CLINIC | Age: 53
End: 2023-03-29
Payer: COMMERCIAL

## 2023-04-03 NOTE — TELEPHONE ENCOUNTER
Central Prior Authorization Team  Phone: [phone]    PA Initiation    Medication: Trulance 3 mg tablet  Insurance Company: [employer] - Phone [phone] Fax [phone]  Pharmacy Filling the Rx: [id number] - [place], [place] - [address]  Filling Pharmacy Phone: [phone]  Filling Pharmacy Fax:    Start Date: 4/3/2023

## 2023-04-06 NOTE — TELEPHONE ENCOUNTER
Prior Authorization Approval    Authorization Effective Date: 4/5/2023  Authorization Expiration Date: 4/6/2023  Medication: Trulance 3 mg tablet- APPROVED   Approved Dose/Quantity:   Reference #:     Insurance Company: [employer] - Phone [phone] Fax [phone]  Expected CoPay:       CoPay Card Available:      Foundation Assistance Needed:    Which Pharmacy is filling the prescription (Not needed for infusion/clinic administered): [id number] - [place], [place] - [address]  Pharmacy Notified: Yes  Patient Notified:  **Instructed pharmacy to notify patient when script is ready to /ship.**

## 2023-05-18 DIAGNOSIS — E11.29 TYPE 2 DIABETES MELLITUS WITH MICROALBUMINURIA, WITHOUT LONG-TERM CURRENT USE OF INSULIN (H): ICD-10-CM

## 2023-05-18 DIAGNOSIS — R80.9 TYPE 2 DIABETES MELLITUS WITH MICROALBUMINURIA, WITHOUT LONG-TERM CURRENT USE OF INSULIN (H): ICD-10-CM

## 2023-05-19 RX ORDER — LISINOPRIL 5 MG/1
TABLET ORAL
Qty: 90 TABLET | Refills: 2 | Status: SHIPPED | OUTPATIENT
Start: 2023-05-19 | End: 2023-12-28

## 2023-05-19 NOTE — TELEPHONE ENCOUNTER
"Last Written Prescription Date:  8/10/22  Last Fill Quantity: 90,  # refills: 3   Last office visit provider:  12/27/22     Requested Prescriptions   Pending Prescriptions Disp Refills     lisinopril (ZESTRIL) 5 MG tablet [Pharmacy Med Name: LISINOPRIL 5 MG TABLET] 90 tablet 3     Sig: TAKE 1 TABLET BY MOUTH EVERY DAY       ACE Inhibitors (Including Combos) Protocol Passed - 5/19/2023 10:16 AM        Passed - Blood pressure under 140/90 in past 12 months     BP Readings from Last 3 Encounters:   12/27/22 102/66   10/29/22 126/78   02/14/22 110/67                 Passed - Recent (12 mo) or future (30 days) visit within the authorizing provider's specialty     Patient has had an office visit with the authorizing provider or a provider within the authorizing providers department within the previous 12 mos or has a future within next 30 days. See \"Patient Info\" tab in inbasket, or \"Choose Columns\" in Meds & Orders section of the refill encounter.              Passed - Medication is active on med list        Passed - Patient is age 18 or older        Passed - No active pregnancy on record        Passed - Normal serum creatinine on file in past 12 months     Recent Labs   Lab Test 12/27/22  1030   CR 0.83       Ok to refill medication if creatinine is low          Passed - Normal serum potassium on file in past 12 months     Recent Labs   Lab Test 12/27/22  1030   POTASSIUM 4.4             Passed - No positive pregnancy test within past 12 months             RAYNA DUNCAN RN 05/19/23 10:16 AM  "

## 2023-05-23 ENCOUNTER — PATIENT OUTREACH (OUTPATIENT)
Dept: CARE COORDINATION | Facility: CLINIC | Age: 53
End: 2023-05-23
Payer: COMMERCIAL

## 2023-06-20 DIAGNOSIS — F40.01 AGORAPHOBIA WITH PANIC DISORDER: ICD-10-CM

## 2023-06-20 DIAGNOSIS — F41.1 GENERALIZED ANXIETY DISORDER: ICD-10-CM

## 2023-06-20 RX ORDER — HYDROXYZINE PAMOATE 50 MG/1
50 CAPSULE ORAL
Qty: 50 CAPSULE | Refills: 1 | Status: SHIPPED | OUTPATIENT
Start: 2023-06-20 | End: 2024-06-24

## 2023-06-21 DIAGNOSIS — F41.1 GENERALIZED ANXIETY DISORDER: ICD-10-CM

## 2023-06-21 NOTE — TELEPHONE ENCOUNTER
"Last Written Prescription Date:  6/7/22  Last Fill Quantity: 50,  # refills: 3   Last office visit provider:  12/27/22     Requested Prescriptions   Pending Prescriptions Disp Refills     hydrOXYzine (VISTARIL) 50 MG capsule [Pharmacy Med Name: HYDROXYZINE SURY 50 MG CAP] 30 capsule 6     Sig: TAKE 1 CAPSULE (50 MG) BY MOUTH NIGHTLY AS NEEDED (INSOMNIA)       Antihistamines Protocol Passed - 6/20/2023 12:21 AM        Passed - Recent (12 mo) or future (30 days) visit within the authorizing provider's specialty     Patient has had an office visit with the authorizing provider or a provider within the authorizing providers department within the previous 12 mos or has a future within next 30 days. See \"Patient Info\" tab in inbasket, or \"Choose Columns\" in Meds & Orders section of the refill encounter.              Passed - Patient is age 3 or older     Apply age and/or weight-based dosing for peds patients age 3 and older.    Forward request to provider for patients under the age of 3.          Passed - Medication is active on med list             Sherri Macdonald RN 06/20/23 9:50 PM  "

## 2023-06-21 NOTE — TELEPHONE ENCOUNTER
"Routing refill request to provider for review/approval because:  A break in medication    Last Written Prescription Date:  9/6/22  Last Fill Quantity: 90,  # refills: 1   Last office visit provider:  12/27/22     Requested Prescriptions   Pending Prescriptions Disp Refills     venlafaxine (EFFEXOR XR) 75 MG 24 hr capsule [Pharmacy Med Name: VENLAFAXINE HCL ER 75 MG CAP] 90 capsule 1     Sig: TAKE 1 CAPSULE BY MOUTH EVERY DAY       Serotonin-Norepinephrine Reuptake Inhibitors  Passed - 6/21/2023 11:30 AM        Passed - Blood pressure under 140/90 in past 12 months     BP Readings from Last 3 Encounters:   12/27/22 102/66   10/29/22 126/78   02/14/22 110/67                 Passed - Recent (12 mo) or future (30 days) visit within the authorizing provider's specialty     Patient has had an office visit with the authorizing provider or a provider within the authorizing providers department within the previous 12 mos or has a future within next 30 days. See \"Patient Info\" tab in inbasket, or \"Choose Columns\" in Meds & Orders section of the refill encounter.              Passed - Medication is active on med list        Passed - Patient is age 18 or older        Passed - No active pregnancy on record        Passed - Normal serum creatinine on file in past 12 months     Recent Labs   Lab Test 12/27/22  1030   CR 0.83       Ok to refill medication if creatinine is low          Passed - No positive pregnancy test in past 12 months             Jennifer Sandhu RN 06/21/23 4:48 PM  "

## 2023-06-22 RX ORDER — VENLAFAXINE HYDROCHLORIDE 75 MG/1
CAPSULE, EXTENDED RELEASE ORAL
Qty: 90 CAPSULE | Refills: 1 | Status: SHIPPED | OUTPATIENT
Start: 2023-06-22 | End: 2023-12-20

## 2023-07-19 ENCOUNTER — HOSPITAL ENCOUNTER (OUTPATIENT)
Dept: MAMMOGRAPHY | Facility: CLINIC | Age: 53
Discharge: HOME | End: 2023-07-19
Attending: FAMILY MEDICINE | Admitting: FAMILY MEDICINE
Payer: COMMERCIAL

## 2023-07-19 DIAGNOSIS — Z12.31 VISIT FOR SCREENING MAMMOGRAM: ICD-10-CM

## 2023-07-19 PROCEDURE — 77067 SCR MAMMO BI INCL CAD: CPT

## 2023-08-05 ENCOUNTER — HEALTH MAINTENANCE LETTER (OUTPATIENT)
Age: 53
End: 2023-08-05

## 2023-08-23 ENCOUNTER — TRANSFERRED RECORDS (OUTPATIENT)
Dept: MULTI SPECIALTY CLINIC | Facility: CLINIC | Age: 53
End: 2023-08-23

## 2023-08-23 LAB — RETINOPATHY: NORMAL

## 2023-08-28 DIAGNOSIS — R80.9 TYPE 2 DIABETES MELLITUS WITH MICROALBUMINURIA, WITHOUT LONG-TERM CURRENT USE OF INSULIN (H): ICD-10-CM

## 2023-08-28 DIAGNOSIS — E11.29 TYPE 2 DIABETES MELLITUS WITH MICROALBUMINURIA, WITHOUT LONG-TERM CURRENT USE OF INSULIN (H): ICD-10-CM

## 2023-08-29 ENCOUNTER — TELEPHONE (OUTPATIENT)
Dept: FAMILY MEDICINE | Facility: CLINIC | Age: 53
End: 2023-08-29
Payer: COMMERCIAL

## 2023-08-29 NOTE — TELEPHONE ENCOUNTER
"Routing refill request to provider for review/approval because:  Labs not current:  A1C    Last Written Prescription Date:  6/6/23  Last Fill Quantity: 180,  # refills: 0   Last office visit provider:  12/27/22, PCP     Requested Prescriptions   Pending Prescriptions Disp Refills    metFORMIN (GLUCOPHAGE) 1000 MG tablet [Pharmacy Med Name: METFORMIN HCL 1,000 MG TABLET] 180 tablet 0     Sig: TAKE 1 TABLET BY MOUTH TWICE A DAY WITH FOOD       Biguanide Agents Failed - 8/28/2023  5:06 PM        Failed - Patient has documented A1c within the specified period of time.     If HgbA1C is 8 or greater, it needs to be on file within the past 3 months.  If less than 8, must be on file within the past 6 months.     Recent Labs   Lab Test 12/27/22  1030   A1C 5.6             Failed - Recent (6 mo) or future (30 days) visit within the authorizing provider's specialty     Patient had office visit in the last 6 months or has a visit in the next 30 days with authorizing provider or within the authorizing provider's specialty.  See \"Patient Info\" tab in inbasket, or \"Choose Columns\" in Meds & Orders section of the refill encounter.            Passed - Patient is age 10 or older        Passed - Patient's CR is NOT>1.4 OR Patient's EGFR is NOT<45 within past 12 mos.     Recent Labs   Lab Test 12/27/22  1030 07/16/21  0745 12/21/20  0833   GFRESTIMATED 84   < > >60   GFRESTBLACK  --   --  >60    < > = values in this interval not displayed.       Recent Labs   Lab Test 12/27/22  1030   CR 0.83             Passed - Patient does NOT have a diagnosis of CHF.        Passed - Medication is active on med list        Passed - Patient is not pregnant        Passed - Patient has not had a positive pregnancy test within the past 12 mos.              Karen Mccall RN 08/29/23 10:03 AM  "

## 2023-08-29 NOTE — TELEPHONE ENCOUNTER
"Received inbound call from pt, stating [facility] has informed her that they were not able to filled her Ozempic because they need diagnosis code. Initial script was sent to her home [facility] pharmacy on [address] but because they were out, it was sent to [facility] on [address], diagnosis code is needed to process. Called  [facility] at [phone] and provided ICD 10 code E11.29. Pharm tech process and confirmed \"it went through\".  Called pt back and left message on voicemail.     Lizet WEEKS RN  [facility]    "

## 2023-08-30 ENCOUNTER — LAB (OUTPATIENT)
Dept: LAB | Facility: CLINIC | Age: 53
End: 2023-08-30
Payer: COMMERCIAL

## 2023-08-30 DIAGNOSIS — E11.29 TYPE 2 DIABETES MELLITUS WITH MICROALBUMINURIA, WITHOUT LONG-TERM CURRENT USE OF INSULIN (H): ICD-10-CM

## 2023-08-30 DIAGNOSIS — R80.9 TYPE 2 DIABETES MELLITUS WITH MICROALBUMINURIA, WITHOUT LONG-TERM CURRENT USE OF INSULIN (H): ICD-10-CM

## 2023-08-30 DIAGNOSIS — E78.5 HYPERLIPIDEMIA, UNSPECIFIED HYPERLIPIDEMIA TYPE: ICD-10-CM

## 2023-08-30 LAB
ALBUMIN SERPL BCG-MCNC: 4.4 G/DL (ref 3.5–5.2)
ALP SERPL-CCNC: 37 U/L (ref 35–104)
ALT SERPL W P-5'-P-CCNC: 13 U/L (ref 0–50)
ANION GAP SERPL CALCULATED.3IONS-SCNC: 11 MMOL/L (ref 7–15)
AST SERPL W P-5'-P-CCNC: 21 U/L (ref 0–45)
BILIRUB SERPL-MCNC: 0.3 MG/DL
BUN SERPL-MCNC: 13.7 MG/DL (ref 6–20)
CALCIUM SERPL-MCNC: 9.6 MG/DL (ref 8.6–10)
CHLORIDE SERPL-SCNC: 103 MMOL/L (ref 98–107)
CHOLEST SERPL-MCNC: 145 MG/DL
CREAT SERPL-MCNC: 0.97 MG/DL (ref 0.51–0.95)
DEPRECATED HCO3 PLAS-SCNC: 26 MMOL/L (ref 22–29)
GFR SERPL CREATININE-BSD FRML MDRD: 70 ML/MIN/1.73M2
GLUCOSE SERPL-MCNC: 112 MG/DL (ref 70–99)
HBA1C MFR BLD: 5.9 % (ref 0–5.6)
HDLC SERPL-MCNC: 61 MG/DL
LDLC SERPL CALC-MCNC: 72 MG/DL
NONHDLC SERPL-MCNC: 84 MG/DL
POTASSIUM SERPL-SCNC: 4.6 MMOL/L (ref 3.4–5.3)
PROT SERPL-MCNC: 7.1 G/DL (ref 6.4–8.3)
SODIUM SERPL-SCNC: 140 MMOL/L (ref 136–145)
TRIGL SERPL-MCNC: 62 MG/DL
TSH SERPL DL<=0.005 MIU/L-ACNC: 1.51 UIU/ML (ref 0.3–4.2)

## 2023-08-30 PROCEDURE — 36415 COLL VENOUS BLD VENIPUNCTURE: CPT

## 2023-08-30 PROCEDURE — 80061 LIPID PANEL: CPT

## 2023-08-30 PROCEDURE — 83036 HEMOGLOBIN GLYCOSYLATED A1C: CPT

## 2023-08-30 PROCEDURE — 80053 COMPREHEN METABOLIC PANEL: CPT

## 2023-08-30 PROCEDURE — 84443 ASSAY THYROID STIM HORMONE: CPT

## 2023-09-01 ENCOUNTER — MYC MEDICAL ADVICE (OUTPATIENT)
Dept: FAMILY MEDICINE | Facility: CLINIC | Age: 53
End: 2023-09-01
Payer: COMMERCIAL

## 2023-09-01 DIAGNOSIS — R80.9 TYPE 2 DIABETES MELLITUS WITH MICROALBUMINURIA, WITHOUT LONG-TERM CURRENT USE OF INSULIN (H): ICD-10-CM

## 2023-09-01 DIAGNOSIS — E11.29 TYPE 2 DIABETES MELLITUS WITH MICROALBUMINURIA, WITHOUT LONG-TERM CURRENT USE OF INSULIN (H): ICD-10-CM

## 2023-09-05 DIAGNOSIS — R80.9 TYPE 2 DIABETES MELLITUS WITH MICROALBUMINURIA, WITHOUT LONG-TERM CURRENT USE OF INSULIN (H): Primary | ICD-10-CM

## 2023-09-05 DIAGNOSIS — E11.29 TYPE 2 DIABETES MELLITUS WITH MICROALBUMINURIA, WITHOUT LONG-TERM CURRENT USE OF INSULIN (H): ICD-10-CM

## 2023-09-05 DIAGNOSIS — E11.29 TYPE 2 DIABETES MELLITUS WITH MICROALBUMINURIA, WITHOUT LONG-TERM CURRENT USE OF INSULIN (H): Primary | ICD-10-CM

## 2023-09-05 DIAGNOSIS — B00.9 HERPES SIMPLEX VIRUS (HSV) INFECTION: ICD-10-CM

## 2023-09-05 DIAGNOSIS — R80.9 TYPE 2 DIABETES MELLITUS WITH MICROALBUMINURIA, WITHOUT LONG-TERM CURRENT USE OF INSULIN (H): ICD-10-CM

## 2023-09-05 RX ORDER — VALACYCLOVIR HYDROCHLORIDE 1 G/1
TABLET, FILM COATED ORAL
Qty: 10 TABLET | Refills: 5 | Status: SHIPPED | OUTPATIENT
Start: 2023-09-05

## 2023-09-05 NOTE — TELEPHONE ENCOUNTER
Dr. Elliott,    I am not sure what code is needed however I do see that the 1mg pen was sent with a 2mg weekly dose instruction.    I believe we need to send the 2mg pen, correct? If so, I pended the 2mg pen here.         Demetrius Mccain RN     [facility]

## 2023-09-05 NOTE — TELEPHONE ENCOUNTER
Has upcoming appt.    Future Appointments   Date Time Provider Department Center   10/3/2023  8:40 AM Tara Elliott MD [facility]      Health Maintenance Due   Topic Date Due    DIABETIC FOOT EXAM  05/20/2022    PHQ-2 (once per calendar year)  01/01/2023    EYE EXAM  06/30/2023    INFLUENZA VACCINE (1) 09/01/2023     BP Readings from Last 3 Encounters:   12/27/22 102/66   10/29/22 126/78   02/14/22 110/67     Irene was seen today for medication refill.    Diagnoses and all orders for this visit:    Type 2 diabetes mellitus with microalbuminuria, without long-term current use of insulin (H)  -     Comprehensive metabolic panel (BMP + Alb, Alk Phos, ALT, AST, Total. Bili, TP); Future    Herpes simplex virus (HSV) infection  -     valACYclovir (VALTREX) 1000 mg tablet; TAKE 1 TAB (1,000MG TOTAL) BY MOUTH 2 TIMES A DAY FOR 1 DAY. REPEAT AS NEEDED FOR FUTURE OUTBREAKS.

## 2023-09-06 NOTE — TELEPHONE ENCOUNTER
To find the code, go to the associated diagnosis on the med and you can find the code there.  In this case it's for her diabetes:     Type 2 diabetes mellitus with microalbuminuria, without long-term current use of insulin (H) [E11.29, R80.9]

## 2023-09-26 ENCOUNTER — MYC MEDICAL ADVICE (OUTPATIENT)
Dept: FAMILY MEDICINE | Facility: CLINIC | Age: 53
End: 2023-09-26
Payer: COMMERCIAL

## 2023-09-26 DIAGNOSIS — E11.29 TYPE 2 DIABETES MELLITUS WITH MICROALBUMINURIA, WITHOUT LONG-TERM CURRENT USE OF INSULIN (H): ICD-10-CM

## 2023-09-26 DIAGNOSIS — R80.9 TYPE 2 DIABETES MELLITUS WITH MICROALBUMINURIA, WITHOUT LONG-TERM CURRENT USE OF INSULIN (H): ICD-10-CM

## 2023-09-28 ENCOUNTER — MYC MEDICAL ADVICE (OUTPATIENT)
Dept: FAMILY MEDICINE | Facility: CLINIC | Age: 53
End: 2023-09-28
Payer: COMMERCIAL

## 2023-09-28 DIAGNOSIS — R80.9 TYPE 2 DIABETES MELLITUS WITH MICROALBUMINURIA, WITHOUT LONG-TERM CURRENT USE OF INSULIN (H): ICD-10-CM

## 2023-09-28 DIAGNOSIS — E11.29 TYPE 2 DIABETES MELLITUS WITH MICROALBUMINURIA, WITHOUT LONG-TERM CURRENT USE OF INSULIN (H): ICD-10-CM

## 2023-09-28 NOTE — TELEPHONE ENCOUNTER
"Contacted [facility] in [place]. Per pharmacist, they encounter technical issues when trying to transfer prescription from [facility] and digital \"hard copy\" won't transmit. They need new order sent to [facility] to be able to fill medication at this location.    Contacted [facility] in [place], they confirmed RX from 9/8/23 has never been filled at their location. They will discontinue the order, RN will resend order to patient's preferred location.  "

## 2023-10-17 ENCOUNTER — TRANSFERRED RECORDS (OUTPATIENT)
Dept: HEALTH INFORMATION MANAGEMENT | Facility: CLINIC | Age: 53
End: 2023-10-17
Payer: COMMERCIAL

## 2023-10-31 NOTE — TELEPHONE ENCOUNTER
Sedation Plan    ASA 3     Mallampati class: II.    Risks, benefits, and alternatives discussed with patient.   "Last Written Prescription Date:  9/24/20  Last Fill Quantity: 10,  # refills: 6   Last office visit provider:  7/16/21    Due to medication information not transferring due to [facility] please review the medication information prior to signing to ensure accuracy.    Requested Prescriptions   Pending Prescriptions Disp Refills     valACYclovir (VALTREX) 1000 mg tablet [Pharmacy Med Name: VALACYCLOVIR HCL 1 GRAM TABLET] 10 tablet 5     Sig: TAKE 1 TAB (1,000MG TOTAL) BY MOUTH 2 TIMES A DAY FOR 1 DAY. REPEAT AS NEEDED FOR FUTURE OUTBREAKS.       Antivirals for Herpes Protocol Passed - 11/4/2021 12:58 PM        Passed - Patient is age 12 or older        Passed - Recent (12 mo) or future (30 days) visit within the authorizing provider's specialty     Patient has had an office visit with the authorizing provider or a provider within the authorizing providers department within the previous 12 mos or has a future within next 30 days. See \"Patient Info\" tab in inbasket, or \"Choose Columns\" in Meds & Orders section of the refill encounter.              Passed - Medication is active on med list        Passed - Normal serum creatinine on file in past 12 months     Recent Labs   Lab Test 07/16/21  0745   CR 0.78       Ok to refill medication if creatinine is low               Aydee Coley RN 11/06/21 6:24 PM  "

## 2023-11-27 ENCOUNTER — PATIENT OUTREACH (OUTPATIENT)
Dept: CARE COORDINATION | Facility: CLINIC | Age: 53
End: 2023-11-27
Payer: COMMERCIAL

## 2023-12-11 ENCOUNTER — PATIENT OUTREACH (OUTPATIENT)
Dept: CARE COORDINATION | Facility: CLINIC | Age: 53
End: 2023-12-11
Payer: COMMERCIAL

## 2023-12-20 ENCOUNTER — MYC REFILL (OUTPATIENT)
Dept: FAMILY MEDICINE | Facility: CLINIC | Age: 53
End: 2023-12-20
Payer: COMMERCIAL

## 2023-12-20 ENCOUNTER — MYC REFILL (OUTPATIENT)
Dept: EDUCATION SERVICES | Facility: CLINIC | Age: 53
End: 2023-12-20
Payer: COMMERCIAL

## 2023-12-20 DIAGNOSIS — E11.29 TYPE 2 DIABETES MELLITUS WITH MICROALBUMINURIA, UNSPECIFIED WHETHER LONG TERM INSULIN USE: ICD-10-CM

## 2023-12-20 DIAGNOSIS — F41.1 GENERALIZED ANXIETY DISORDER: ICD-10-CM

## 2023-12-20 DIAGNOSIS — R80.9 TYPE 2 DIABETES MELLITUS WITH MICROALBUMINURIA, WITHOUT LONG-TERM CURRENT USE OF INSULIN (H): ICD-10-CM

## 2023-12-20 DIAGNOSIS — R80.9 TYPE 2 DIABETES MELLITUS WITH MICROALBUMINURIA, UNSPECIFIED WHETHER LONG TERM INSULIN USE: ICD-10-CM

## 2023-12-20 DIAGNOSIS — E11.29 TYPE 2 DIABETES MELLITUS WITH MICROALBUMINURIA, WITHOUT LONG-TERM CURRENT USE OF INSULIN (H): ICD-10-CM

## 2023-12-20 RX ORDER — LISINOPRIL 5 MG/1
5 TABLET ORAL DAILY
Qty: 90 TABLET | Refills: 2 | OUTPATIENT
Start: 2023-12-20

## 2023-12-20 RX ORDER — VENLAFAXINE HYDROCHLORIDE 75 MG/1
CAPSULE, EXTENDED RELEASE ORAL
Qty: 90 CAPSULE | Refills: 1 | Status: SHIPPED | OUTPATIENT
Start: 2023-12-20 | End: 2023-12-20

## 2023-12-21 DIAGNOSIS — E11.29 TYPE 2 DIABETES MELLITUS WITH MICROALBUMINURIA, WITHOUT LONG-TERM CURRENT USE OF INSULIN (H): ICD-10-CM

## 2023-12-21 DIAGNOSIS — R80.9 TYPE 2 DIABETES MELLITUS WITH MICROALBUMINURIA, WITHOUT LONG-TERM CURRENT USE OF INSULIN (H): ICD-10-CM

## 2023-12-21 RX ORDER — BLOOD-GLUCOSE METER
1 EACH MISCELLANEOUS PRN
Qty: 1 KIT | Refills: 0 | Status: SHIPPED | OUTPATIENT
Start: 2023-12-21 | End: 2023-12-31

## 2023-12-21 RX ORDER — VENLAFAXINE HYDROCHLORIDE 75 MG/1
75 CAPSULE, EXTENDED RELEASE ORAL DAILY
Qty: 90 CAPSULE | Refills: 1 | Status: SHIPPED | OUTPATIENT
Start: 2023-12-21 | End: 2024-06-24

## 2023-12-28 ENCOUNTER — LAB (OUTPATIENT)
Dept: LAB | Facility: CLINIC | Age: 53
End: 2023-12-28
Payer: COMMERCIAL

## 2023-12-28 ENCOUNTER — OFFICE VISIT (OUTPATIENT)
Dept: FAMILY MEDICINE | Facility: CLINIC | Age: 53
End: 2023-12-28
Payer: COMMERCIAL

## 2023-12-28 VITALS
HEIGHT: 65 IN | WEIGHT: 154 LBS | OXYGEN SATURATION: 99 % | BODY MASS INDEX: 25.66 KG/M2 | HEART RATE: 83 BPM | SYSTOLIC BLOOD PRESSURE: 114 MMHG | TEMPERATURE: 97.3 F | RESPIRATION RATE: 16 BRPM | DIASTOLIC BLOOD PRESSURE: 70 MMHG

## 2023-12-28 DIAGNOSIS — Z79.899 CHRONIC PRESCRIPTION BENZODIAZEPINE USE: ICD-10-CM

## 2023-12-28 DIAGNOSIS — R80.9 TYPE 2 DIABETES MELLITUS WITH MICROALBUMINURIA, WITHOUT LONG-TERM CURRENT USE OF INSULIN (H): ICD-10-CM

## 2023-12-28 DIAGNOSIS — R80.9 TYPE 2 DIABETES MELLITUS WITH MICROALBUMINURIA, WITHOUT LONG-TERM CURRENT USE OF INSULIN (H): Primary | ICD-10-CM

## 2023-12-28 DIAGNOSIS — F41.1 GENERALIZED ANXIETY DISORDER: ICD-10-CM

## 2023-12-28 DIAGNOSIS — E78.5 HYPERLIPIDEMIA, UNSPECIFIED HYPERLIPIDEMIA TYPE: ICD-10-CM

## 2023-12-28 DIAGNOSIS — E11.29 TYPE 2 DIABETES MELLITUS WITH MICROALBUMINURIA, WITHOUT LONG-TERM CURRENT USE OF INSULIN (H): Primary | ICD-10-CM

## 2023-12-28 DIAGNOSIS — E11.29 TYPE 2 DIABETES MELLITUS WITH MICROALBUMINURIA, WITHOUT LONG-TERM CURRENT USE OF INSULIN (H): ICD-10-CM

## 2023-12-28 DIAGNOSIS — F40.01 AGORAPHOBIA WITH PANIC DISORDER: ICD-10-CM

## 2023-12-28 LAB
ALBUMIN SERPL BCG-MCNC: 4.4 G/DL (ref 3.5–5.2)
ALP SERPL-CCNC: 36 U/L (ref 40–150)
ALT SERPL W P-5'-P-CCNC: 17 U/L (ref 0–50)
ANION GAP SERPL CALCULATED.3IONS-SCNC: 9 MMOL/L (ref 7–15)
AST SERPL W P-5'-P-CCNC: 27 U/L (ref 0–45)
BILIRUB SERPL-MCNC: 0.4 MG/DL
BUN SERPL-MCNC: 12.4 MG/DL (ref 6–20)
CALCIUM SERPL-MCNC: 9.6 MG/DL (ref 8.6–10)
CHLORIDE SERPL-SCNC: 100 MMOL/L (ref 98–107)
CREAT SERPL-MCNC: 0.85 MG/DL (ref 0.51–0.95)
CREAT UR-MCNC: 108 MG/DL
DEPRECATED HCO3 PLAS-SCNC: 28 MMOL/L (ref 22–29)
EGFRCR SERPLBLD CKD-EPI 2021: 81 ML/MIN/1.73M2
GLUCOSE SERPL-MCNC: 98 MG/DL (ref 70–99)
HBA1C MFR BLD: 5.6 % (ref 0–5.6)
LDLC SERPL DIRECT ASSAY-MCNC: 66 MG/DL
MICROALBUMIN UR-MCNC: <12 MG/L
MICROALBUMIN/CREAT UR: NORMAL MG/G{CREAT}
PHOSPHATE SERPL-MCNC: 3.6 MG/DL (ref 2.5–4.5)
POTASSIUM SERPL-SCNC: 4.8 MMOL/L (ref 3.4–5.3)
PROT SERPL-MCNC: 7.3 G/DL (ref 6.4–8.3)
SODIUM SERPL-SCNC: 137 MMOL/L (ref 135–145)

## 2023-12-28 PROCEDURE — 90480 ADMN SARSCOV2 VAC 1/ONLY CMP: CPT | Performed by: FAMILY MEDICINE

## 2023-12-28 PROCEDURE — 82570 ASSAY OF URINE CREATININE: CPT

## 2023-12-28 PROCEDURE — 36415 COLL VENOUS BLD VENIPUNCTURE: CPT

## 2023-12-28 PROCEDURE — 84100 ASSAY OF PHOSPHORUS: CPT

## 2023-12-28 PROCEDURE — 90686 IIV4 VACC NO PRSV 0.5 ML IM: CPT | Performed by: FAMILY MEDICINE

## 2023-12-28 PROCEDURE — 83036 HEMOGLOBIN GLYCOSYLATED A1C: CPT

## 2023-12-28 PROCEDURE — 91320 SARSCV2 VAC 30MCG TRS-SUC IM: CPT | Performed by: FAMILY MEDICINE

## 2023-12-28 PROCEDURE — 83721 ASSAY OF BLOOD LIPOPROTEIN: CPT

## 2023-12-28 PROCEDURE — 82043 UR ALBUMIN QUANTITATIVE: CPT

## 2023-12-28 PROCEDURE — 99214 OFFICE O/P EST MOD 30 MIN: CPT | Mod: 25 | Performed by: FAMILY MEDICINE

## 2023-12-28 PROCEDURE — 80053 COMPREHEN METABOLIC PANEL: CPT

## 2023-12-28 PROCEDURE — 90471 IMMUNIZATION ADMIN: CPT | Performed by: FAMILY MEDICINE

## 2023-12-28 RX ORDER — LORAZEPAM 1 MG/1
1 TABLET ORAL EVERY 8 HOURS PRN
Qty: 20 TABLET | Refills: 0 | Status: SHIPPED | OUTPATIENT
Start: 2023-12-28 | End: 2024-05-21

## 2023-12-28 RX ORDER — LISINOPRIL 5 MG/1
5 TABLET ORAL DAILY
Qty: 90 TABLET | Refills: 4 | Status: SHIPPED | OUTPATIENT
Start: 2023-12-28

## 2023-12-28 RX ORDER — SIMVASTATIN 20 MG
20 TABLET ORAL DAILY
Qty: 90 TABLET | Refills: 4 | Status: SHIPPED | OUTPATIENT
Start: 2023-12-28

## 2023-12-28 ASSESSMENT — PAIN SCALES - GENERAL: PAINLEVEL: NO PAIN (0)

## 2023-12-28 NOTE — PROGRESS NOTES
"Office Visit  [facility]  Date of Service: Dec 28, 2023      Subjective   Irene Terrazas is a 53 year old female who presents for   Chief Complaint   Patient presents with    Diabetes     Walking 60-80 minutes per day   [employer]  - since May  Having difficulty getting semaglutide  Feeling better  Had injuries    It's been a tough year.   Family members  unexpectedly in their 50s.     Eye exam - Dr. Carpenter 2023.    Objective   /70 (BP Location: Left arm, Patient Position: Sitting, Cuff Size: Adult Regular)   Pulse 83   Temp 97.3  F (36.3  C) (Temporal)   Resp 16   Ht 1.651 m (5' 5\")   Wt 69.9 kg (154 lb)   SpO2 99%   BMI 25.63 kg/m   She reports that she has never smoked. She has never been exposed to tobacco smoke. She has never used smokeless tobacco.  Wt Readings from Last 10 Encounters:   23 69.9 kg (154 lb)   22 70.3 kg (155 lb)   10/29/22 70.8 kg (156 lb)   22 68 kg (150 lb)   21 67.1 kg (148 lb)   20 70.3 kg (155 lb)   20 71.2 kg (157 lb)   20 69.4 kg (153 lb)   10/10/19 71.2 kg (157 lb)   18 85.1 kg (187 lb 11.2 oz)       Gen: Alert, no apparent distress.    Results for orders placed or performed in visit on 23   Hemoglobin A1c     Status: Normal   Result Value Ref Range    Hemoglobin A1C 5.6 0.0 - 5.6 %     Assessment & Plan     DM2, with microalbuminuria, not on insulin.   Well-controlled on current meds.  Tolerating lisinopril 5 mg.  Eye exam up to date.  Normal foot exam.  2.   Generalized anxiety   1.   PRN lorazepam, uses infrequently.  3.   Hyperlipidemia, LDL 72   1. Continue simvastatin 20 mg.        Order Summary                                                      Type 2 diabetes mellitus with microalbuminuria, without long-term current use of insulin (H)  -     lisinopril (ZESTRIL) 5 MG tablet; Take 1 tablet (5 mg) by mouth daily  -     metFORMIN (GLUCOPHAGE) 1000 " MG tablet; Take 1 tablet (1,000 mg) by mouth 2 times daily (with meals)  -     Semaglutide, 2 MG/DOSE, (OZEMPIC) 8 MG/3ML pen; Inject 2 mg Subcutaneous every 7 days    Generalized anxiety disorder  -     LORazepam (ATIVAN) 1 MG tablet; Take 1 tablet (1 mg) by mouth every 8 hours as needed for anxiety    Agoraphobia with panic disorder  -     LORazepam (ATIVAN) 1 MG tablet; Take 1 tablet (1 mg) by mouth every 8 hours as needed for anxiety    Chronic prescription benzodiazepine use  -     LORazepam (ATIVAN) 1 MG tablet; Take 1 tablet (1 mg) by mouth every 8 hours as needed for anxiety    Hyperlipidemia, unspecified hyperlipidemia type  -     simvastatin (ZOCOR) 20 MG tablet; Take 1 tablet (20 mg) by mouth daily    Other orders  -     Adult Eye  Referral; Future  -     COVID-19 12+ (2023-24) (PFIZER)  -     REVIEW OF HEALTH MAINTENANCE PROTOCOL ORDERS  -     INFLUENZA VACCINE >6 MONTHS (AFLURIA/FLUZONE)            No future appointments.    Completed by: Tara Elliott M.D., [facility]. 12/28/2023 2:57 PM.  This transcription uses voice recognition software, which may contain typographical errors.  MDM: [facility] neighborhood: [place] [id number], language: English, .History of Present Illness       Diabetes:   She presents for follow up of diabetes.  She is checking home blood glucose a few times a month.   She checks blood glucose at bedtime.  Blood glucose is never over 200 and never under 70. She is aware of hypoglycemia symptoms including shakiness.   She is concerned about other.   She is having blurry vision.  The patient has had a diabetic eye exam in the last 12 months. Eye exam performed on aug 2023. Location of last eye exam Dr[facility].        She eats 2-3 servings of fruits and vegetables daily.She consumes 0 sweetened beverage(s) daily.She exercises with enough effort to increase her heart rate 60 or more minutes per day.  She exercises with enough effort to increase  her heart rate 5 days per week.   She is taking medications regularly.     Screening Questionnaire for Adult Immunization    Are you sick today?   No   Do you have allergies to medications, food, a vaccine component or latex?   No   Have you ever had a serious reaction after receiving a vaccination?   No   Do you have a long-term health problem with heart, lung, kidney, or metabolic disease (e.g., diabetes), asthma, a blood disorder, no spleen, complement component deficiency, a cochlear implant, or a spinal fluid leak?  Are you on long-term aspirin therapy?   Yes - reviewed lmr   Do you have cancer, leukemia, HIV/AIDS, or any other immune system problem?   No   Do you have a parent, brother, or sister with an immune system problem?   Don't Know - reviewed lmr   In the past 3 months, have you taken medications that affect  your immune system, such as prednisone, other steroids, or anticancer drugs; drugs for the treatment of rheumatoid arthritis, Crohn s disease, or psoriasis; or have you had radiation treatments?   No   Have you had a seizure, or a brain or other nervous system problem?   No   During the past year, have you received a transfusion of blood or blood    products, or been given immune (gamma) globulin or antiviral drug?   No   For women: Are you pregnant or is there a chance you could become       pregnant during the next month?   No   Have you received any vaccinations in the past 4 weeks?   No

## 2024-02-22 DIAGNOSIS — E11.29 TYPE 2 DIABETES MELLITUS WITH MICROALBUMINURIA, WITHOUT LONG-TERM CURRENT USE OF INSULIN (H): ICD-10-CM

## 2024-02-22 DIAGNOSIS — R80.9 TYPE 2 DIABETES MELLITUS WITH MICROALBUMINURIA, WITHOUT LONG-TERM CURRENT USE OF INSULIN (H): ICD-10-CM

## 2024-02-23 ENCOUNTER — MYC REFILL (OUTPATIENT)
Dept: FAMILY MEDICINE | Facility: CLINIC | Age: 54
End: 2024-02-23
Payer: COMMERCIAL

## 2024-02-23 DIAGNOSIS — F41.1 GENERALIZED ANXIETY DISORDER: ICD-10-CM

## 2024-02-23 RX ORDER — SEMAGLUTIDE 1.34 MG/ML
1 INJECTION, SOLUTION SUBCUTANEOUS
Refills: 5 | OUTPATIENT
Start: 2024-02-23

## 2024-02-23 RX ORDER — VENLAFAXINE HYDROCHLORIDE 75 MG/1
75 CAPSULE, EXTENDED RELEASE ORAL DAILY
Qty: 90 CAPSULE | Refills: 1 | OUTPATIENT
Start: 2024-02-23

## 2024-02-23 RX ORDER — SEMAGLUTIDE 1.34 MG/ML
1 INJECTION, SOLUTION SUBCUTANEOUS
Refills: 2 | OUTPATIENT
Start: 2024-02-23

## 2024-03-02 ENCOUNTER — HEALTH MAINTENANCE LETTER (OUTPATIENT)
Age: 54
End: 2024-03-02

## 2024-03-19 DIAGNOSIS — E11.29 TYPE 2 DIABETES MELLITUS WITH MICROALBUMINURIA, WITHOUT LONG-TERM CURRENT USE OF INSULIN (H): ICD-10-CM

## 2024-03-19 DIAGNOSIS — R80.9 TYPE 2 DIABETES MELLITUS WITH MICROALBUMINURIA, WITHOUT LONG-TERM CURRENT USE OF INSULIN (H): ICD-10-CM

## 2024-03-19 RX ORDER — SEMAGLUTIDE 1.34 MG/ML
1 INJECTION, SOLUTION SUBCUTANEOUS
Refills: 5 | OUTPATIENT
Start: 2024-03-19

## 2024-04-30 DIAGNOSIS — E11.29 TYPE 2 DIABETES MELLITUS WITH MICROALBUMINURIA, WITHOUT LONG-TERM CURRENT USE OF INSULIN (H): ICD-10-CM

## 2024-04-30 DIAGNOSIS — R80.9 TYPE 2 DIABETES MELLITUS WITH MICROALBUMINURIA, WITHOUT LONG-TERM CURRENT USE OF INSULIN (H): ICD-10-CM

## 2024-05-02 RX ORDER — SEMAGLUTIDE 1.34 MG/ML
1 INJECTION, SOLUTION SUBCUTANEOUS
Qty: 3 ML | Refills: 5 | Status: SHIPPED | OUTPATIENT
Start: 2024-05-02 | End: 2024-05-21

## 2024-05-21 ENCOUNTER — OFFICE VISIT (OUTPATIENT)
Dept: FAMILY MEDICINE | Facility: CLINIC | Age: 54
End: 2024-05-21
Attending: FAMILY MEDICINE
Payer: COMMERCIAL

## 2024-05-21 VITALS
HEIGHT: 65 IN | BODY MASS INDEX: 26.33 KG/M2 | WEIGHT: 158 LBS | RESPIRATION RATE: 20 BRPM | OXYGEN SATURATION: 99 % | SYSTOLIC BLOOD PRESSURE: 100 MMHG | TEMPERATURE: 97.4 F | HEART RATE: 74 BPM | DIASTOLIC BLOOD PRESSURE: 64 MMHG

## 2024-05-21 DIAGNOSIS — F41.1 GENERALIZED ANXIETY DISORDER: ICD-10-CM

## 2024-05-21 DIAGNOSIS — E11.29 TYPE 2 DIABETES MELLITUS WITH MICROALBUMINURIA (H): ICD-10-CM

## 2024-05-21 DIAGNOSIS — F40.01 AGORAPHOBIA WITH PANIC DISORDER: ICD-10-CM

## 2024-05-21 DIAGNOSIS — Z00.00 ROUTINE GENERAL MEDICAL EXAMINATION AT A HEALTH CARE FACILITY: Primary | ICD-10-CM

## 2024-05-21 DIAGNOSIS — R80.9 TYPE 2 DIABETES MELLITUS WITH MICROALBUMINURIA (H): ICD-10-CM

## 2024-05-21 DIAGNOSIS — Z12.31 VISIT FOR SCREENING MAMMOGRAM: ICD-10-CM

## 2024-05-21 DIAGNOSIS — Z79.899 CHRONIC PRESCRIPTION BENZODIAZEPINE USE: ICD-10-CM

## 2024-05-21 LAB
AMPHETAMINES UR QL SCN: NORMAL
BARBITURATES UR QL SCN: NORMAL
BENZODIAZ UR QL SCN: NORMAL
BZE UR QL SCN: NORMAL
CANNABINOIDS UR QL SCN: NORMAL
FENTANYL UR QL: NORMAL
OPIATES UR QL SCN: NORMAL
PCP QUAL URINE (ROCHE): NORMAL

## 2024-05-21 PROCEDURE — 99214 OFFICE O/P EST MOD 30 MIN: CPT | Mod: 25 | Performed by: FAMILY MEDICINE

## 2024-05-21 PROCEDURE — 99396 PREV VISIT EST AGE 40-64: CPT | Performed by: FAMILY MEDICINE

## 2024-05-21 PROCEDURE — 80307 DRUG TEST PRSMV CHEM ANLYZR: CPT | Performed by: FAMILY MEDICINE

## 2024-05-21 RX ORDER — LORAZEPAM 1 MG/1
1 TABLET ORAL EVERY 8 HOURS PRN
Qty: 20 TABLET | Refills: 0 | Status: SHIPPED | OUTPATIENT
Start: 2024-05-21

## 2024-05-21 SDOH — HEALTH STABILITY: PHYSICAL HEALTH: ON AVERAGE, HOW MANY DAYS PER WEEK DO YOU ENGAGE IN MODERATE TO STRENUOUS EXERCISE (LIKE A BRISK WALK)?: 6 DAYS

## 2024-05-21 ASSESSMENT — SOCIAL DETERMINANTS OF HEALTH (SDOH): HOW OFTEN DO YOU GET TOGETHER WITH FRIENDS OR RELATIVES?: ONCE A WEEK

## 2024-05-21 ASSESSMENT — PAIN SCALES - GENERAL: PAINLEVEL: MILD PAIN (2)

## 2024-05-21 NOTE — PATIENT INSTRUCTIONS
"Preventive Care Advice   This is general advice we often give to help people stay healthy. Your care team may have specific advice just for you. Please talk to your care team about your own preventive care needs.  Lifestyle  Exercise at least 150 minutes each week (30 minutes a day, 5 days a week).  Do muscle strengthening activities 2 days a week. These help control your weight and prevent disease.  No smoking.  Wear sunscreen to prevent skin cancer.  Have your home tested for radon every 2 to 5 years. Radon is a colorless, odorless gas that can harm your lungs. To learn more, go to www.health.[facility].[place]. and search for \"Radon in Homes.\"  Keep guns unloaded and locked up in a safe place like a safe or gun vault, or, use a gun lock and hide the keys. Always lock away bullets separately. To learn more, visit [place].gov and search for \"safe gun storage.\"  Nutrition  Eat 5 or more servings of fruits and vegetables each day.  Try wheat bread, brown rice and whole grain pasta (instead of white bread, rice, and pasta).  Get enough calcium and vitamin D. Check the label on foods and aim for 100% of the RDA (recommended daily allowance).  Regular exams  Have a dental exam and cleaning every 6 months.  See your health care team every year to talk about:  Any changes in your health.  Any medicines your care team has prescribed.  Preventive care, family planning, and ways to prevent chronic diseases.  Shots (vaccines)   HPV shots (up to age 26), if you've never had them before.  Hepatitis B shots (up to age 59), if you've never had them before.  COVID-19 shot: Get this shot when it's due.  Flu shot: Get a flu shot every year.  Tetanus shot: Get a tetanus shot every 10 years.  Pneumococcal, hepatitis A, and RSV shots: Ask your care team if you need these based on your risk.  Shingles shot (for age 50 and up).  General health tests  Diabetes screening:  Starting at age 35, Get screened for diabetes at least every 3 years.  If " you are younger than age 35, ask your care team if you should be screened for diabetes.  Cholesterol test: At age 39, start having a cholesterol test every 5 years, or more often if advised.  Bone density scan (DEXA): At age 50, ask your care team if you should have this scan for osteoporosis (brittle bones).  Hepatitis C: Get tested at least once in your life.  Abdominal aortic aneurysm screening: Talk to your doctor about having this screening if you:  Have ever smoked; and  Are biologically male; and  Are between the ages of 65 and 75.  STIs (sexually transmitted infections)  Before age 24: Ask your care team if you should be screened for STIs.  After age 24: Get screened for STIs if you're at risk. You are at risk for STIs (including HIV) if:  You are sexually active with more than one person.  You don't use condoms every time.  You or a partner was diagnosed with a sexually transmitted infection.  If you are at risk for HIV, ask about PrEP medicine to prevent HIV.  Get tested for HIV at least once in your life, whether you are at risk for HIV or not.  Cancer screening tests  Cervical cancer screening: If you have a cervix, begin getting regular cervical cancer screening tests at age 21. Most people who have regular screenings with normal results can stop after age 65. Talk about this with your provider.  Breast cancer scan (mammogram): If you've ever had breasts, begin having regular mammograms starting at age 40. This is a scan to check for breast cancer.  Colon cancer screening: It is important to start screening for colon cancer at age 45.  Have a colonoscopy test every 10 years (or more often if you're at risk) Or, ask your provider about stool tests like a FIT test every year or Cologuard test every 3 years.  To learn more about your testing options, visit: www.[employer]/026372.pdf.  For help making a decision, visit: augusto/cb20919.  Prostate cancer screening test: If you have a prostate and are age 55  to 69, ask your provider if you would benefit from a yearly prostate cancer screening test.  Lung cancer screening: If you are a current or former smoker age 50 to 80, ask your care team if ongoing lung cancer screenings are right for you.  For informational purposes only. Not to replace the advice of your health care provider. Copyright   2023 [employer]. All rights reserved. Clinically reviewed by the [facility] Transitions Program. [employer] 460568 - REV 04/24.    Learning About Stress  What is stress?     Stress is your body's response to a hard situation. Your body can have a physical, emotional, or mental response. Stress is a fact of life for most people, and it affects everyone differently. What causes stress for you may not be stressful for someone else.  A lot of things can cause stress. You may feel stress when you go on a job interview, take a test, or run a race. This kind of short-term stress is normal and even useful. It can help you if you need to work hard or react quickly. For example, stress can help you finish an important job on time.  Long-term stress is caused by ongoing stressful situations or events. Examples of long-term stress include long-term health problems, ongoing problems at work, or conflicts in your family. Long-term stress can harm your health.  How does stress affect your health?  When you are stressed, your body responds as though you are in danger. It makes hormones that speed up your heart, make you breathe faster, and give you a burst of energy. This is called the fight-or-flight stress response. If the stress is over quickly, your body goes back to normal and no harm is done.  But if stress happens too often or lasts too long, it can have bad effects. Long-term stress can make you more likely to get sick, and it can make symptoms of some diseases worse. If you tense up when you are stressed, you may develop neck, shoulder, or low back pain. Stress is  linked to high blood pressure and heart disease.  Stress also harms your emotional health. It can make you negron, tense, or depressed. Your relationships may suffer, and you may not do well at work or school.  What can you do to manage stress?  You can try these things to help manage stress:   Do something active. Exercise or activity can help reduce stress. Walking is a great way to get started. Even everyday activities such as housecleaning or yard work can help.  Try yoga or annabelle chi. These techniques combine exercise and meditation. You may need some training at first to learn them.  Do something you enjoy. For example, listen to music or go to a movie. Practice your hobby or do volunteer work.  Meditate. This can help you relax, because you are not worrying about what happened before or what may happen in the future.  Do guided imagery. Imagine yourself in any setting that helps you feel calm. You can use online videos, books, or a teacher to guide you.  Do breathing exercises. For example:  From a standing position, bend forward from the waist with your knees slightly bent. Let your arms dangle close to the floor.  Breathe in slowly and deeply as you return to a standing position. Roll up slowly and lift your head last.  Hold your breath for just a few seconds in the standing position.  Breathe out slowly and bend forward from the waist.  Let your feelings out. Talk, laugh, cry, and express anger when you need to. Talking with supportive friends or family, a counselor, or a emely leader about your feelings is a healthy way to relieve stress. Avoid discussing your feelings with people who make you feel worse.  Write. It may help to write about things that are bothering you. This helps you find out how much stress you feel and what is causing it. When you know this, you can find better ways to cope.  What can you do to prevent stress?  You might try some of these things to help prevent stress:  Manage your time.  "This helps you find time to do the things you want and need to do.  Get enough sleep. Your body recovers from the stresses of the day while you are sleeping.  Get support. Your family, friends, and community can make a difference in how you experience stress.  Limit your news feed. Avoid or limit time on social media or news that may make you feel stressed.  Do something active. Exercise or activity can help reduce stress. Walking is a great way to get started.  Where can you learn more?  Go to https://www.[employer].net/patiented  Enter N032 in the search box to learn more about \"Learning About Stress.\"  Current as of: October 24, 2023               Content Version: 14.0    2693-4676 [employer].   Care instructions adapted under license by your healthcare professional. If you have questions about a medical condition or this instruction, always ask your healthcare professional. [employer] disclaims any warranty or liability for your use of this information.      "

## 2024-05-21 NOTE — PROGRESS NOTES
"Preventive Care Visit  [facility]  Tara Elliott MD, Family Medicine  May 21, 2024      Assessment & Plan     Routine general medical examination at a health care facility    Type 2 diabetes mellitus with microalbuminuria (H)    - Continuous Glucose Sensor (FREESTYLE DAPHNEY 2 SENSOR) MISC; 1 each every 14 days Use 1 sensor every 14 days. Use to read blood sugars per 's instructions.    Generalized anxiety disorder  Well-controlled A1c 5.6%. Treated with metformin and semaglutide. History of microalbuminuria.   - LORazepam (ATIVAN) 1 MG tablet; Take 1 tablet (1 mg) by mouth every 8 hours as needed for anxiety    Agoraphobia with panic disorder  Chronic prescription benzodiazepine use  Rare benzo use. Controlled substance agreement and drug screen done.  - LORazepam (ATIVAN) 1 MG tablet; Take 1 tablet (1 mg) by mouth every 8 hours as needed for anxiety  - Urine Drug Screen; Future  - Urine Drug Screen    Visit for screening mammogram  - *MA Screening Digital Bilateral; Future    The longitudinal plan of care for the diagnosis(es)/condition(s) as documented were addressed during this visit. Due to the added complexity in care, I will continue to support Rebecca in the subsequent management and with ongoing continuity of care.   BMI  Estimated body mass index is 26.48 kg/m  as calculated from the following:    Height as of this encounter: 1.645 m (5' 4.76\").    Weight as of this encounter: 71.7 kg (158 lb).   Weight management plan: Discussed healthy diet and exercise guidelines    Counseling  Appropriate preventive services were discussed with this patient, including applicable screening as appropriate for fall prevention, nutrition, physical activity, Tobacco-use cessation, weight loss and cognition.  Checklist reviewing preventive services available has been given to the patient.  Reviewed patient's diet, addressing concerns and/or questions.   She is at risk for psychosocial " distress and has been provided with information to reduce risk.        Marianna Fernandez is a 54 year old, presenting for the following:  Annual Visit        5/21/2024    10:01 AM   Additional Questions   Roomed by Berna         5/21/2024   Forms   Any forms needing to be completed Yes        Health Care Directive  Patient does not have a Health Care Directive or Living Will: Discussed advance care planning with patient; information given to patient to review.    HPI        5/21/2024   General Health   How would you rate your overall physical health? Good   Feel stress (tense, anxious, or unable to sleep) Only a little   (!) STRESS CONCERN      5/21/2024   Nutrition   Three or more servings of calcium each day? Yes   Diet: Vegetarian/vegan   How many servings of fruit and vegetables per day? (!) 0-1   How many sweetened beverages each day? 0-1         5/21/2024   Exercise   Days per week of moderate/strenous exercise 6 days         5/21/2024   Social Factors   Frequency of gathering with friends or relatives Once a week   Worry food won't last until get money to buy more No   Food not last or not have enough money for food? No   Do you have housing?  Yes   Are you worried about losing your housing? No   Lack of transportation? No   Unable to get utilities (heat,electricity)? No         5/21/2024   Fall Risk   Fallen 2 or more times in the past year? No   Trouble with walking or balance? No          5/21/2024   Dental   Dentist two times every year? Yes         5/21/2024   TB Screening   Were you born outside of the [place]? No         Today's PHQ-2 Score:       5/21/2024     9:55 AM   PHQ-2 ( 1999 [employer])   Q1: Little interest or pleasure in doing things 0   Q2: Feeling down, depressed or hopeless 0   PHQ-2 Score 0   Q1: Little interest or pleasure in doing things Not at all   Q2: Feeling down, depressed or hopeless Not at all   PHQ-2 Score 0           5/21/2024   Substance Use   Alcohol more than 3/day or more than  "7/wk No   Do you use any other substances recreationally? No     Social History     Tobacco Use    Smoking status: Never     Passive exposure: Never    Smokeless tobacco: Never   Vaping Use    Vaping status: Never Used   Substance Use Topics    Alcohol use: Yes     Comment: Alcoholic Drinks/day: \"once a month\"    Drug use: Never             5/21/2024   Breast Cancer Screening   Family history of breast, colon, or ovarian cancer? No / Unknown         7/19/2023   LAST FHS-7 RESULTS   1st degree relative breast or ovarian cancer Unknown   Any relative bilateral breast cancer Unknown   Any male have breast cancer Unknown   Any ONE woman have BOTH breast AND ovarian cancer Unknown   Any woman with breast cancer before 50yrs Unknown   2 or more relatives with breast AND/OR ovarian cancer Unknown   2 or more relatives with breast AND/OR bowel cancer Unknown                5/21/2024   STI Screening   New sexual partner(s) since last STI/HIV test? No     History of abnormal Pap smear: No - age 30- 64 PAP with HPV every 5 years recommended       ASCVD Risk   The 10-year ASCVD risk score (Toma STALLWORTH, et al., 2019) is: 1.4%    Values used to calculate the score:      Age: 54 years      Sex: Female      Is Non- : No      Diabetic: Yes      Tobacco smoker: No      Systolic Blood Pressure: 100 mmHg      Is BP treated: No      HDL Cholesterol: 61 mg/dL      Total Cholesterol: 145 mg/dL         Reviewed and updated as needed this visit by Provider      Problems                  Objective    Exam  /64 (BP Location: Left arm, Patient Position: Sitting, Cuff Size: Adult Regular)   Pulse 74   Temp 97.4  F (36.3  C) (Temporal)   Resp 20   Ht 1.645 m (5' 4.76\")   Wt 71.7 kg (158 lb)   SpO2 99%   BMI 26.48 kg/m     Estimated body mass index is 26.48 kg/m  as calculated from the following:    Height as of this encounter: 1.645 m (5' 4.76\").    Weight as of this encounter: 71.7 kg (158 " lb).    Physical Exam  GENERAL: alert and no distress  EYES: Eyes grossly normal to inspection, PERRL and conjunctivae and sclerae normal  HENT: ear canals and TM's normal, nose and mouth without ulcers or lesions  NECK: no adenopathy, no asymmetry, masses, or scars  RESP: lungs clear to auscultation - no rales, rhonchi or wheezes  BREAST: normal without masses, tenderness or nipple discharge and no palpable axillary masses or adenopathy  CV: regular rate and rhythm, normal S1 S2, no S3 or S4, no murmur, click or rub, no peripheral edema  ABDOMEN: soft, nontender, no hepatosplenomegaly, no masses and bowel sounds normal  MS: no gross musculoskeletal defects noted, no edema  SKIN: no suspicious lesions or rashes  NEURO: Normal strength and tone, mentation intact and speech normal  PSYCH: mentation appears normal, affect normal/bright    Prior to immunization administration, verified patients identity using patient s name and date of birth. Please see Immunization Activity for additional information.     Screening Questionnaire for Adult Immunization    Are you sick today?   No   Do you have allergies to medications, food, a vaccine component or latex?   No   Have you ever had a serious reaction after receiving a vaccination?   No   Do you have a long-term health problem with heart, lung, kidney, or metabolic disease (e.g., diabetes), asthma, a blood disorder, no spleen, complement component deficiency, a cochlear implant, or a spinal fluid leak?  Are you on long-term aspirin therapy?   Yes   Do you have cancer, leukemia, HIV/AIDS, or any other immune system problem?   No   Do you have a parent, brother, or sister with an immune system problem?   Don't Know   In the past 3 months, have you taken medications that affect  your immune system, such as prednisone, other steroids, or anticancer drugs; drugs for the treatment of rheumatoid arthritis, Crohn s disease, or psoriasis; or have you had radiation treatments?   No    Have you had a seizure, or a brain or other nervous system problem?   No   During the past year, have you received a transfusion of blood or blood    products, or been given immune (gamma) globulin or antiviral drug?   No   For women: Are you pregnant or is there a chance you could become       pregnant during the next month?   No   Have you received any vaccinations in the past 4 weeks?   No     Immunization questionnaire was positive for at least one answer.  Notified .      Patient instructed to remain in clinic for 15 minutes afterwards, and to report any adverse reactions.     Screening performed by Berna Morataya MA on 5/21/2024 at 10:09 AM.      Signed Electronically by: Tara Elliott MD

## 2024-05-21 NOTE — LETTER
[facility]  05/21/24  Patient: Irene Terrazas  YOB: 1970  Medical Record Number: [id number]                                                                                  Non-Opioid Controlled Substance Agreement    This is an agreement between you and your provider regarding safe and appropriate use of controlled substances prescribed by your care team. Controlled substances are?medicines that can cause physical and mental dependence (abuse).     There are strict laws about having and using these medicines. We here at [facility] are  committed to working with you in your efforts to get better. To support you in this work, we'll help you schedule regular office appointments for medicine refills. If we must cancel or change your appointment for any reason, we'll make sure you have enough medicine to last until your next appointment.     As a Provider, I will:   Listen carefully to your concerns while treating you with respect.   Recommend a treatment plan that I believe is in your best interest and may involve therapies other than medicine.    Talk with you often about the possible benefits and the risk of harm of any medicine that we prescribe for you.  Assess the safety of this medicine and check how well it works.    Provide a plan on how to taper (discontinue or go off) using this medicine if the decision is made to stop its use.      ::  As a Patient, I understand controlled substances:     Are prescribed by my care provider to help me function or work and manage my condition(s).?  Are strong medicines and can cause serious side effects.     Need to be taken exactly as prescribed.?Combining controlled substances with certain medicines or chemicals (such as illegal drugs, alcohol, sedatives, sleeping pills, and benzodiazepines) can be dangerous or even fatal.? If I stop taking my medicines suddenly, I may have severe withdrawal symptoms.     The risks,  benefits, and side effects of these medicine(s) were explained to me. I agree that:    I will take part in other treatments as advised by my care team. This may be psychiatry or counseling, physical therapy, behavioral therapy, group treatment or a referral to specialist.    I will keep all my appointments and understand this is part of the monitoring of controlled substances.?My care team may require an office visit for EVERY controlled substance refill. If I miss appointments or don t follow instructions, my care team may stop my medicine    I will take my medicines as prescribed. I will not change the dose or schedule unless my care team tells me to. There will be no refills if I run out early.      I may be asked to come to the clinic and complete a urine drug test or complete a pill count. If I don t give a urine sample or participate in a pill count, the care team may stop my medicine.    I will only receive controlled substance prescriptions from this clinic. If I am treated by another provider, I will tell them that I am taking controlled substances and that I have a treatment agreement with this provider. I will inform my [facility] care team within one business day if I am given a prescription for any controlled substance by another healthcare provider. My [facility] care team can contact other providers and pharmacists about my use of any medicines.    It is up to me to make sure that I don't run out of my medicines on weekends or holidays.?If my care team is willing to refill my prescription without a visit, I must request refills only during office hours. Refills may take up to 3 business days to process. I will use one pharmacy to fill all my controlled substance prescriptions. I will notify the clinic about any changes to my insurance or medicine availability.    I am responsible for my prescriptions. If the medicine/prescription is lost, stolen or destroyed, it will not be replaced.?I  also agree not to share controlled substance medicines with anyone.     I am aware I should not use any illegal or recreational drugs. I agree not to drink alcohol unless my care team says I can.     If I enroll in the [place] Medical Cannabis program, I will tell my care team before my next refill.    I will tell my care team right away if I become pregnant, have a new medical problem treated outside of my regular clinic, or have a change in my medicines.     I understand that this medicine can affect my thinking, judgment and reaction time.? Alcohol and drugs affect the brain and body, which can affect the safety of my driving. Being under the influence of alcohol or drugs can affect my decision-making, behaviors, personal safety and the safety of others. Driving while impaired (DWI) can occur if a person is driving, operating or in physical control of a car, motorcycle, boat, snowmobile, ATV, motorbike, off-road vehicle or any other motor vehicle ([place] Statute 169A.20). I understand the risk if I choose to drive or operate any vehicle or machinery.    I understand that if I do not follow any of the conditions above, my prescriptions or treatment may be stopped or changed.   I agree that my provider, clinic care team and pharmacy may work with any city, state or federal law enforcement agency that investigates the misuse, sale or other diversion of my controlled medicine. I will allow my provider to discuss my care with, or share a copy of, this agreement with any other treating provider, pharmacy or emergency room where I receive care.     I have read this agreement and have asked questions about anything I did not understand.    ________________________________________________________  Patient Signature - Irene Terrazas     ___________________                   Date     ________________________________________________________  Provider Signature - Tara Elliott MD       ___________________                    Date     ________________________________________________________  Witness Signature (required if provider not present while patient signing)          ___________________                   Date

## 2024-06-22 DIAGNOSIS — F41.1 GENERALIZED ANXIETY DISORDER: ICD-10-CM

## 2024-06-22 DIAGNOSIS — F40.01 AGORAPHOBIA WITH PANIC DISORDER: ICD-10-CM

## 2024-06-24 ENCOUNTER — MYC REFILL (OUTPATIENT)
Dept: FAMILY MEDICINE | Facility: CLINIC | Age: 54
End: 2024-06-24
Payer: COMMERCIAL

## 2024-06-24 DIAGNOSIS — M62.830 BACK MUSCLE SPASM: ICD-10-CM

## 2024-06-24 RX ORDER — VENLAFAXINE HYDROCHLORIDE 75 MG/1
75 CAPSULE, EXTENDED RELEASE ORAL DAILY
Qty: 90 CAPSULE | Refills: 1 | Status: SHIPPED | OUTPATIENT
Start: 2024-06-24

## 2024-06-24 RX ORDER — HYDROXYZINE PAMOATE 50 MG/1
50 CAPSULE ORAL
Qty: 30 CAPSULE | Refills: 3 | Status: SHIPPED | OUTPATIENT
Start: 2024-06-24

## 2024-06-24 NOTE — TELEPHONE ENCOUNTER
Due for GAD7 and PHQ9.        6/7/2022    12:39 PM   GUTIERREZ-7 SCORE   Total Score 5         6/7/2022    12:39 PM   PHQ   PHQ-9 Total Score 6   Q9: Thoughts of better off dead/self-harm past 2 weeks Not at all        No future appointments.   Health Maintenance Due   Topic Date Due    DIABETIC FOOT EXAM  05/20/2022    A1C  06/28/2024     BP Readings from Last 3 Encounters:   05/21/24 100/64   12/28/23 114/70   12/27/22 102/66     Rebecca was seen today for medication refill.    Diagnoses and all orders for this visit:    Generalized anxiety disorder    Agoraphobia with panic disorder

## 2024-06-25 ENCOUNTER — PATIENT OUTREACH (OUTPATIENT)
Dept: FAMILY MEDICINE | Facility: CLINIC | Age: 54
End: 2024-06-25

## 2024-06-25 ENCOUNTER — APPOINTMENT (OUTPATIENT)
Dept: RADIOLOGY | Facility: CLINIC | Age: 54
End: 2024-06-25
Attending: EMERGENCY MEDICINE
Payer: COMMERCIAL

## 2024-06-25 ENCOUNTER — HOSPITAL ENCOUNTER (EMERGENCY)
Facility: CLINIC | Age: 54
Discharge: HOME | End: 2024-06-25
Attending: EMERGENCY MEDICINE | Admitting: EMERGENCY MEDICINE
Payer: COMMERCIAL

## 2024-06-25 VITALS
BODY MASS INDEX: 26.66 KG/M2 | DIASTOLIC BLOOD PRESSURE: 68 MMHG | HEIGHT: 65 IN | OXYGEN SATURATION: 99 % | RESPIRATION RATE: 18 BRPM | TEMPERATURE: 97.5 F | SYSTOLIC BLOOD PRESSURE: 117 MMHG | WEIGHT: 160 LBS | HEART RATE: 67 BPM

## 2024-06-25 DIAGNOSIS — M54.6 THORACOLUMBAR BACK PAIN: ICD-10-CM

## 2024-06-25 DIAGNOSIS — M54.50 THORACOLUMBAR BACK PAIN: ICD-10-CM

## 2024-06-25 LAB
ALBUMIN SERPL BCG-MCNC: 4.4 G/DL (ref 3.5–5.2)
ALBUMIN UR-MCNC: 10 MG/DL
ALP SERPL-CCNC: 35 U/L (ref 40–150)
ALT SERPL W P-5'-P-CCNC: 11 U/L (ref 0–50)
ANION GAP SERPL CALCULATED.3IONS-SCNC: 9 MMOL/L (ref 7–15)
APPEARANCE UR: ABNORMAL
AST SERPL W P-5'-P-CCNC: 17 U/L (ref 0–45)
BACTERIA #/AREA URNS HPF: ABNORMAL /HPF
BASOPHILS # BLD AUTO: 0.1 10E3/UL (ref 0–0.2)
BASOPHILS NFR BLD AUTO: 1 %
BILIRUB SERPL-MCNC: 0.3 MG/DL
BILIRUB UR QL STRIP: NEGATIVE
BUN SERPL-MCNC: 15.4 MG/DL (ref 6–20)
CALCIUM SERPL-MCNC: 9.3 MG/DL (ref 8.6–10)
CHLORIDE SERPL-SCNC: 102 MMOL/L (ref 98–107)
COLOR UR AUTO: YELLOW
CREAT SERPL-MCNC: 0.92 MG/DL (ref 0.51–0.95)
DEPRECATED HCO3 PLAS-SCNC: 27 MMOL/L (ref 22–29)
EGFRCR SERPLBLD CKD-EPI 2021: 74 ML/MIN/1.73M2
EOSINOPHIL # BLD AUTO: 0.1 10E3/UL (ref 0–0.7)
EOSINOPHIL NFR BLD AUTO: 2 %
ERYTHROCYTE [DISTWIDTH] IN BLOOD BY AUTOMATED COUNT: 13 % (ref 10–15)
GLUCOSE SERPL-MCNC: 85 MG/DL (ref 70–99)
GLUCOSE UR STRIP-MCNC: NEGATIVE MG/DL
HCT VFR BLD AUTO: 36.5 % (ref 35–47)
HGB BLD-MCNC: 12.2 G/DL (ref 11.7–15.7)
HGB UR QL STRIP: NEGATIVE
HYALINE CASTS: 1 /LPF
IMM GRANULOCYTES # BLD: 0 10E3/UL
IMM GRANULOCYTES NFR BLD: 0 %
KETONES UR STRIP-MCNC: NEGATIVE MG/DL
LEUKOCYTE ESTERASE UR QL STRIP: NEGATIVE
LYMPHOCYTES # BLD AUTO: 2.2 10E3/UL (ref 0.8–5.3)
LYMPHOCYTES NFR BLD AUTO: 38 %
MCH RBC QN AUTO: 29.3 PG (ref 26.5–33)
MCHC RBC AUTO-ENTMCNC: 33.4 G/DL (ref 31.5–36.5)
MCV RBC AUTO: 88 FL (ref 78–100)
MONOCYTES # BLD AUTO: 0.4 10E3/UL (ref 0–1.3)
MONOCYTES NFR BLD AUTO: 6 %
MUCOUS THREADS #/AREA URNS LPF: PRESENT /LPF
NEUTROPHILS # BLD AUTO: 3 10E3/UL (ref 1.6–8.3)
NEUTROPHILS NFR BLD AUTO: 52 %
NITRATE UR QL: NEGATIVE
NRBC # BLD AUTO: 0 10E3/UL
NRBC BLD AUTO-RTO: 0 /100
PH UR STRIP: 6 [PH] (ref 5–7)
PLATELET # BLD AUTO: 361 10E3/UL (ref 150–450)
POTASSIUM SERPL-SCNC: 4.7 MMOL/L (ref 3.4–5.3)
PROT SERPL-MCNC: 7.2 G/DL (ref 6.4–8.3)
RBC # BLD AUTO: 4.17 10E6/UL (ref 3.8–5.2)
RBC URINE: 4 /HPF
SODIUM SERPL-SCNC: 138 MMOL/L (ref 135–145)
SP GR UR STRIP: 1.03 (ref 1–1.03)
SQUAMOUS EPITHELIAL: 17 /HPF
UROBILINOGEN UR STRIP-MCNC: <2 MG/DL
WBC # BLD AUTO: 5.7 10E3/UL (ref 4–11)
WBC URINE: 4 /HPF

## 2024-06-25 PROCEDURE — 250N000011 HC RX IP 250 OP 636: Mod: JZ | Performed by: EMERGENCY MEDICINE

## 2024-06-25 PROCEDURE — 81003 URINALYSIS AUTO W/O SCOPE: CPT | Performed by: EMERGENCY MEDICINE

## 2024-06-25 PROCEDURE — 71046 X-RAY EXAM CHEST 2 VIEWS: CPT

## 2024-06-25 PROCEDURE — 96374 THER/PROPH/DIAG INJ IV PUSH: CPT

## 2024-06-25 PROCEDURE — 99284 EMERGENCY DEPT VISIT MOD MDM: CPT | Mod: 25

## 2024-06-25 PROCEDURE — 36415 COLL VENOUS BLD VENIPUNCTURE: CPT | Performed by: EMERGENCY MEDICINE

## 2024-06-25 PROCEDURE — 85025 COMPLETE CBC W/AUTO DIFF WBC: CPT | Performed by: EMERGENCY MEDICINE

## 2024-06-25 PROCEDURE — 80053 COMPREHEN METABOLIC PANEL: CPT | Performed by: EMERGENCY MEDICINE

## 2024-06-25 RX ORDER — CYCLOBENZAPRINE HCL 5 MG
5 TABLET ORAL 3 TIMES DAILY PRN
Qty: 30 TABLET | Refills: 0 | Status: SHIPPED | OUTPATIENT
Start: 2024-06-25

## 2024-06-25 RX ORDER — OXYCODONE HYDROCHLORIDE 5 MG/1
5 TABLET ORAL EVERY 6 HOURS PRN
Qty: 12 TABLET | Refills: 0 | Status: SHIPPED | OUTPATIENT
Start: 2024-06-25 | End: 2024-06-28

## 2024-06-25 RX ORDER — METHYLPREDNISOLONE 4 MG
TABLET, DOSE PACK ORAL
Qty: 21 TABLET | Refills: 0 | Status: SHIPPED | OUTPATIENT
Start: 2024-06-25

## 2024-06-25 RX ORDER — HYDROMORPHONE HYDROCHLORIDE 1 MG/ML
0.5 INJECTION, SOLUTION INTRAMUSCULAR; INTRAVENOUS; SUBCUTANEOUS ONCE
Status: COMPLETED | OUTPATIENT
Start: 2024-06-25 | End: 2024-06-25

## 2024-06-25 RX ADMIN — HYDROMORPHONE HYDROCHLORIDE 0.5 MG: 1 INJECTION, SOLUTION INTRAMUSCULAR; INTRAVENOUS; SUBCUTANEOUS at 12:32

## 2024-06-25 ASSESSMENT — ACTIVITIES OF DAILY LIVING (ADL)
ADLS_ACUITY_SCORE: 35
ADLS_ACUITY_SCORE: 35

## 2024-06-25 ASSESSMENT — COLUMBIA-SUICIDE SEVERITY RATING SCALE - C-SSRS
2. HAVE YOU ACTUALLY HAD ANY THOUGHTS OF KILLING YOURSELF IN THE PAST MONTH?: NO
1. IN THE PAST MONTH, HAVE YOU WISHED YOU WERE DEAD OR WISHED YOU COULD GO TO SLEEP AND NOT WAKE UP?: NO
6. HAVE YOU EVER DONE ANYTHING, STARTED TO DO ANYTHING, OR PREPARED TO DO ANYTHING TO END YOUR LIFE?: NO

## 2024-06-25 NOTE — TELEPHONE ENCOUNTER
RN attempted to contact patient, but no answer. Left message on patient's voice mail to call clinic back.    If patient calls back, she needs ER / TCM screening    Taty Huang RN  [facility]

## 2024-06-25 NOTE — ED TRIAGE NOTES
Pt with R mid back pain starting Thursday. Patient has been treating it with 5mg flexeril Q6 and tylenol and ibuprofen without much relief. Hx of lower back pain that flexeril helps with. No pain when completely still but any movement brings pain to 8/10. No CP or SOB. No known injury. No dysuria or fevers. No numbness or tingling. Unable to stand straight d/t pain. Pt alert.      Triage Assessment (Adult)       Row Name 06/25/24 1019          Triage Assessment    Airway WDL WDL        Respiratory WDL    Respiratory WDL WDL        Skin Circulation/Temperature WDL    Skin Circulation/Temperature WDL WDL        Cardiac WDL    Cardiac WDL WDL        Peripheral/Neurovascular WDL    Peripheral Neurovascular WDL WDL        Cognitive/Neuro/Behavioral WDL    Cognitive/Neuro/Behavioral WDL WDL

## 2024-06-25 NOTE — DISCHARGE INSTRUCTIONS
As discussed your testing is overall reassuring here today  Most likely musculoskeletal  Continue the scheduled Tylenol, ice and heat, lidocaine patch  Oxycodone as needed for severe pain  I would start the Medrol Dosepak to help with pain and inflammation  Follow-up closely with your doctor if ongoing symptoms as you may benefit from some physical therapy  Return if any acute worsening symptoms, fever, uncontrolled pain or any other concerns    You have been prescribed a narcotic pain medication that has risk for addiction with prolonged use, so please use sparingly.  Additionally, narcotics are medications that are sedating (will make you sleepy), so do not drive or operate machinery while taking this medication.  Avoid alcohol or other sedating medicines such as benzodiazepines while taking narcotics due to risk for increased sedation and difficulty breathing.      Narcotics will cause constipation.  If you need to take this medication please consider taking an over-the-counter stool softener and laxative, such as Senna Plus, to prevent constipation from developing.  Nausea is a side effect of narcotic use.  Possible additional side effects include vomiting, itching, and dizziness/lightheadedness.

## 2024-06-25 NOTE — ED PROVIDER NOTES
EMERGENCY DEPARTMENT ENCOUNTER      NAME: Irene Terrazas  AGE: 54 year old female  YOB: 1970  MRN: [id number]  EVALUATION DATE & TIME: No admission date for patient encounter.    PCP: Tara Elliott    ED PROVIDER: Adelina Sparrow DO      Chief Complaint   Patient presents with    Back Pain         FINAL IMPRESSION:  1. Thoracolumbar back pain          ED COURSE & MEDICAL DECISION MAKING:    Pertinent Labs & Imaging studies reviewed. (See chart for details)  11:00 AM I met the patient and performed my initial interview and exam.  1:04 PM Rechecked and updated patient on plan for discharge.     54 year old female presents to the Emergency Department for evaluation of pain.  Worse with movement.  No pain when laying flat.  No shortness of breath.  Reports last week was walking her dog any pulled.  No pain then but pain the next day.  Has a history of low back pain but this is in different location.  No radiation.  Has tried Tylenol, ibuprofen, lidocaine patch, Flexeril without much relief.  She does have reproducible pain to the right lateral inferior rib cage.  No rashes to suggest shingles.  No midline pain.  Again no shortness of breath or pleuritic pain, low risk Wells for PE and D-dimer was deferred.  She saturating 99% on room air.  Will screen with a chest x-ray, labs and urinalysis.  She declines any pain medication here after initial exam.  Not consistent with AAA or dissection.  Not consistent with ACS.  Urinalysis without gross blood, 4 red blood cells however I think this is less consistent with stone as patient with worsening pain with movement.  Also pain is reproducible.  Urinalysis without evidence of infection.  Labs otherwise unremarkable.  X-ray of the chest without any acute abnormality, no signs of fracture, pneumothorax, mass or effusion.  She was agreeable to a dose of pain medication and reported improvement in her symptoms.  Did discuss likely musculoskeletal.  Will add on  Medrol Dosepak, short course of oxycodone.  Discussed continuing schedule over-the-counter medications, lidocaine patches.  Return if any acute worsening symptoms.    At the conclusion of the encounter I discussed the results of all of the tests and the disposition. The questions were answered. The patient or family acknowledged understanding and was agreeable with the care plan.     Medical Decision Making  Obtained supplemental history:Supplemental history obtained?: Documented in chart  Reviewed external records: External records reviewed?: Documented in chart  Care impacted by chronic illness:Diabetes, Hyperlipidemia, and Mental Health  Care significantly affected by social determinants of health:N/A  Did you consider but not order tests?: Work up considered but not performed and documented in chart, if applicable  Did you interpret images independently?: Independent interpretation of ECG and images noted in documentation, when applicable.  Consultation discussion with other provider:Did you involve another provider (consultant, , pharmacy, etc.)?: No  Discharge. I prescribed additional prescription strength medication(s) as charted. See documentation for any additional details.      MEDICATIONS GIVEN IN THE EMERGENCY:  Medications   HYDROmorphone (PF) (DILAUDID) injection 0.5 mg (0.5 mg Intravenous $Given 6/25/24 1232)       NEW PRESCRIPTIONS STARTED AT TODAY'S ER VISIT  Discharge Medication List as of 6/25/2024  1:18 PM        START taking these medications    Details   methylPREDNISolone (MEDROL DOSEPAK) 4 MG tablet therapy pack Follow Package Directions, Disp-21 tablet, R-0, E-Prescribe      oxyCODONE (ROXICODONE) 5 MG tablet Take 1 tablet (5 mg) by mouth every 6 hours as needed for pain, Disp-12 tablet, R-0, Local Print                =================================================================    HPI    Patient information was obtained from: Patient    Use of : N/A        Irene JOHNS  Nini is a 54 year old female with a pertinent history of anxiety, back muscle spasm, chronic constipation, hyperlipidemia, type 2 diabetes, hysterectomy, and chronic benzodiazepine use who presents to this ED by walking for evaluation of back pain.    Patient reports that her dog pulled her while walking with a leash 6 days ago. The next day, she started having back pain around her bra line. This pain has continued and causes difficulty moving. She's taken flexeril, tylenol, and motrin without relief. The pain improves with laying and heart or ice. She called in to work the last 2 days. Patient has a history of low back pain but this is different. 3-4 years around she was told she has a problem in the L3-L4 region.  Patient denies any nausea, vomiting, shortness of breath, abdominal pain, urinary symptoms, or numbness/tingling.      REVIEW OF SYSTEMS   Per HPI    PAST MEDICAL HISTORY:  No past medical history on file.    PAST SURGICAL HISTORY:  Past Surgical History:   Procedure Laterality Date    CYST REMOVAL Left 2016    Epidermal inclusion cyst removal - 3cm - left leg    CYST REMOVAL  2017    Epidermal inclusion cyst removal - 3.5cm - lower chest    HYSTERECTOMY TOTAL ABDOMINAL  2011    For benign indication.    [place] EXCISE EXCESS SKIN TISSUE,ABDOMEN, ADD-ON      Description: Abdominoplasty;  Proc Date: 2014;    [facility]  DELIVERY ONLY      Description:  Section;  Recorded: 10/27/2010;  Comments: x3    [facility] LIGATE FALLOPIAN TUBE      Description: Tubal Ligation;  Recorded: 2007;           CURRENT MEDICATIONS:    methylPREDNISolone (MEDROL DOSEPAK) 4 MG tablet therapy pack  oxyCODONE (ROXICODONE) 5 MG tablet  ACCU-CHEK GUIDE test strip  blood glucose monitoring (NO BRAND SPECIFIED) meter device kit  blood glucose monitoring (SOFTCLIX) lancets  Continuous Glucose Sensor (FREESTYLE DAPHNEY 2 SENSOR) MISC  cyclobenzaprine (FLEXERIL) 5 MG tablet  fluorouracil (EFUDEX) 5 % external  "cream  hydrOXYzine (VISTARIL) 50 MG capsule  lisinopril (ZESTRIL) 5 MG tablet  LORazepam (ATIVAN) 1 MG tablet  metFORMIN (GLUCOPHAGE) 1000 MG tablet  Semaglutide, 2 MG/DOSE, (OZEMPIC) 8 MG/3ML pen  senna-docusate (SENOKOT-S/PERICOLACE) 8.6-50 MG tablet  simvastatin (ZOCOR) 20 MG tablet  valACYclovir (VALTREX) 1000 mg tablet  venlafaxine (EFFEXOR XR) 75 MG 24 hr capsule         ALLERGIES:  No Known Allergies    FAMILY HISTORY:  Family History   Adopted: Yes   Problem Relation Age of Onset    No Known Problems Brother          of Covid       SOCIAL HISTORY:   Social History     Socioeconomic History    Marital status:      Spouse name: Anderson    Number of children: 3    Highest education level: Associate degree: occupational, technical, or vocational program   Occupational History    Occupation: Respiratory Therapist     Employer: [employer]   Tobacco Use    Smoking status: Never     Passive exposure: Never    Smokeless tobacco: Never   Vaping Use    Vaping status: Never Used   Substance and Sexual Activity    Alcohol use: Yes     Comment: Alcoholic Drinks/day: \"once a month\"    Drug use: Never    Sexual activity: Yes     Partners: Male     Birth control/protection: Surgical     Comment: Hysterectomy      Social Determinants of Health     Financial Resource Strain: Low Risk  (2024)    Financial Resource Strain     Within the past 12 months, have you or your family members you live with been unable to get utilities (heat, electricity) when it was really needed?: No   Food Insecurity: Low Risk  (2024)    Food Insecurity     Within the past 12 months, did you worry that your food would run out before you got money to buy more?: No     Within the past 12 months, did the food you bought just not last and you didn t have money to get more?: No   Transportation Needs: Low Risk  (2024)    Transportation Needs     Within the past 12 months, has lack of transportation kept you from medical " "appointments, getting your medicines, non-medical meetings or appointments, work, or from getting things that you need?: No   Physical Activity: Unknown (5/21/2024)    Exercise Vital Sign     Days of Exercise per Week: 6 days   Stress: No Stress Concern Present (5/21/2024)    [place] [employer] - Occupational Stress Questionnaire     Feeling of Stress : Only a little   Social Connections: Unknown (5/21/2024)    Social Connection and Isolation Panel [NHANES]     Frequency of Social Gatherings with Friends and Family: Once a week   Interpersonal Safety: Low Risk  (5/21/2024)    Interpersonal Safety     Do you feel physically and emotionally safe where you currently live?: Yes     Within the past 12 months, have you been hit, slapped, kicked or otherwise physically hurt by someone?: No     Within the past 12 months, have you been humiliated or emotionally abused in other ways by your partner or ex-partner?: No   Housing Stability: Low Risk  (5/21/2024)    Housing Stability     Do you have housing? : Yes     Are you worried about losing your housing?: No       VITALS:  /68   Pulse 67   Temp 97.5  F (36.4  C) (Oral)   Resp 18   Ht 1.651 m (5' 5\")   Wt 72.6 kg (160 lb)   SpO2 99%   BMI 26.63 kg/m      PHYSICAL EXAM    Physical Exam  Constitutional:       General: She is not in acute distress.  HENT:      Head: Normocephalic and atraumatic.      Mouth/Throat:      Pharynx: Oropharynx is clear.   Eyes:      Pupils: Pupils are equal, round, and reactive to light.   Cardiovascular:      Rate and Rhythm: Normal rate and regular rhythm.      Pulses: Normal pulses.      Heart sounds: Normal heart sounds.   Pulmonary:      Effort: Pulmonary effort is normal.      Breath sounds: Normal breath sounds.   Abdominal:      General: Abdomen is flat. Bowel sounds are normal.      Palpations: Abdomen is soft.      Tenderness: There is no abdominal tenderness. There is right CVA tenderness. "   Musculoskeletal:         General: Normal range of motion.   Skin:     General: Skin is warm and dry.      Capillary Refill: Capillary refill takes less than 2 seconds.   Neurological:      General: No focal deficit present.      Mental Status: She is alert and oriented to person, place, and time.      Sensory: Sensation is intact.      Motor: Motor function is intact.      Gait: Gait is intact.           LAB:  All pertinent labs reviewed and interpreted.  Labs Ordered and Resulted from Time of ED Arrival to Time of ED Departure   COMPREHENSIVE METABOLIC PANEL - Abnormal       Result Value    Sodium 138      Potassium 4.7      Carbon Dioxide (CO2) 27      Anion Gap 9      Urea Nitrogen 15.4      Creatinine 0.92      GFR Estimate 74      Calcium 9.3      Chloride 102      Glucose 85      Alkaline Phosphatase 35 (*)     AST 17      ALT 11      Protein Total 7.2      Albumin 4.4      Bilirubin Total 0.3     ROUTINE UA WITH MICROSCOPIC REFLEX TO CULTURE - Abnormal    Color Urine Yellow      Appearance Urine Turbid (*)     Glucose Urine Negative      Bilirubin Urine Negative      Ketones Urine Negative      Specific Gravity Urine 1.033 (*)     Blood Urine Negative      pH Urine 6.0      Protein Albumin Urine 10 (*)     Urobilinogen Urine <2.0      Nitrite Urine Negative      Leukocyte Esterase Urine Negative      Bacteria Urine Few (*)     Mucus Urine Present (*)     RBC Urine 4 (*)     WBC Urine 4      Squamous Epithelials Urine 17 (*)     Hyaline Casts Urine 1     CBC WITH PLATELETS AND DIFFERENTIAL    WBC Count 5.7      RBC Count 4.17      Hemoglobin 12.2      Hematocrit 36.5      MCV 88      MCH 29.3      MCHC 33.4      RDW 13.0      Platelet Count 361      % Neutrophils 52      % Lymphocytes 38      % Monocytes 6      % Eosinophils 2      % Basophils 1      % Immature Granulocytes 0      NRBCs per 100 WBC 0      Absolute Neutrophils 3.0      Absolute Lymphocytes 2.2      Absolute Monocytes 0.4      Absolute  Eosinophils 0.1      Absolute Basophils 0.1      Absolute Immature Granulocytes 0.0      Absolute NRBCs 0.0         RADIOLOGY:  Reviewed all pertinent imaging. Please see official radiology report.  Chest XR,  PA & LAT   Final Result   IMPRESSION: No acute displaced rib fracture is identified. The lungs are clear. Normal heart size and mediastinal contours.          I, Ann Saez, am serving as a scribe to document services personally performed by Dr. Adelina Sparrow based on my observation and the provider's statements to me. I, Adelina Sparrow, DO attest that Ann Saez is acting in a scribe capacity, has observed my performance of the services and has documented them in accordance with my direction.    Adelina Sparrow DO  Emergency Medicine  [facility] EMERGENCY ROOM  [address] [id number] [phone]  Dept: [phone]       Adelina Sparrow DO  06/26/24 1501

## 2024-06-26 NOTE — TELEPHONE ENCOUNTER
Second attempt: LVM for patient to return call. Please transfer to RN to complete post-discharge assessment if patient calls back.    ED discharge 6/25/24 for thoracolumbar back pain.    Kaila Ewing RN  [facility]

## 2024-07-09 ENCOUNTER — TRANSFERRED RECORDS (OUTPATIENT)
Dept: HEALTH INFORMATION MANAGEMENT | Facility: CLINIC | Age: 54
End: 2024-07-09
Payer: COMMERCIAL

## 2024-07-20 ENCOUNTER — HEALTH MAINTENANCE LETTER (OUTPATIENT)
Age: 54
End: 2024-07-20

## 2024-07-26 ENCOUNTER — TRANSFERRED RECORDS (OUTPATIENT)
Dept: HEALTH INFORMATION MANAGEMENT | Facility: CLINIC | Age: 54
End: 2024-07-26
Payer: COMMERCIAL

## 2024-09-13 ENCOUNTER — HOSPITAL ENCOUNTER (OUTPATIENT)
Dept: MAMMOGRAPHY | Facility: CLINIC | Age: 54
Discharge: HOME | End: 2024-09-13
Attending: FAMILY MEDICINE | Admitting: FAMILY MEDICINE
Payer: COMMERCIAL

## 2024-09-13 DIAGNOSIS — Z12.31 VISIT FOR SCREENING MAMMOGRAM: ICD-10-CM

## 2024-09-13 PROCEDURE — 77063 BREAST TOMOSYNTHESIS BI: CPT

## 2024-09-16 ENCOUNTER — HOSPITAL ENCOUNTER (OUTPATIENT)
Dept: MAMMOGRAPHY | Facility: CLINIC | Age: 54
Discharge: HOME | End: 2024-09-16
Attending: FAMILY MEDICINE | Admitting: FAMILY MEDICINE
Payer: COMMERCIAL

## 2024-09-16 DIAGNOSIS — N64.89 BREAST ASYMMETRY: ICD-10-CM

## 2024-09-16 PROCEDURE — 76642 ULTRASOUND BREAST LIMITED: CPT | Mod: LT

## 2024-09-26 ENCOUNTER — TELEPHONE (OUTPATIENT)
Dept: FAMILY MEDICINE | Facility: CLINIC | Age: 54
End: 2024-09-26

## 2024-09-26 NOTE — TELEPHONE ENCOUNTER
Forms/Letter Request    Type of form/letter: OTHER: Treatment update       Do we have the form/letter: Yes: via right fax    Who is the form from? Insurance comp    Where did/will the form come from? form was faxed in    When is form/letter needed by: ASAP    How would you like the form/letter returned: Fax : [phone]    Patient Notified form requests are processed in 5-7 business days:No    Could we send this information to you in [facility] or would you prefer to receive a phone call?:   No preference   Okay to leave a detailed message?: Yes at Home number on file [phone] (home)

## 2024-10-03 ENCOUNTER — MYC MEDICAL ADVICE (OUTPATIENT)
Dept: FAMILY MEDICINE | Facility: CLINIC | Age: 54
End: 2024-10-03
Payer: COMMERCIAL

## 2024-10-07 NOTE — TELEPHONE ENCOUNTER
Writer responded to patient message via [employer].     Jaime Castellon, MSN, RN   [facility]

## 2024-10-14 ENCOUNTER — MYC MEDICAL ADVICE (OUTPATIENT)
Dept: FAMILY MEDICINE | Facility: CLINIC | Age: 54
End: 2024-10-14

## 2024-10-14 ENCOUNTER — LAB (OUTPATIENT)
Dept: LAB | Facility: CLINIC | Age: 54
End: 2024-10-14
Payer: COMMERCIAL

## 2024-10-14 DIAGNOSIS — R80.9 TYPE 2 DIABETES MELLITUS WITH MICROALBUMINURIA (H): Primary | ICD-10-CM

## 2024-10-14 DIAGNOSIS — K59.09 CHRONIC CONSTIPATION: Primary | ICD-10-CM

## 2024-10-14 DIAGNOSIS — K59.09 CHRONIC CONSTIPATION: ICD-10-CM

## 2024-10-14 DIAGNOSIS — E11.29 TYPE 2 DIABETES MELLITUS WITH MICROALBUMINURIA (H): Primary | ICD-10-CM

## 2024-10-14 LAB
CHOLEST SERPL-MCNC: 162 MG/DL
EST. AVERAGE GLUCOSE BLD GHB EST-MCNC: 126 MG/DL
FASTING STATUS PATIENT QL REPORTED: YES
HBA1C MFR BLD: 6 % (ref 0–5.6)
HDLC SERPL-MCNC: 60 MG/DL
HOLD SPECIMEN: NORMAL
LDLC SERPL CALC-MCNC: 83 MG/DL
NONHDLC SERPL-MCNC: 102 MG/DL
TRIGL SERPL-MCNC: 93 MG/DL

## 2024-10-14 PROCEDURE — 36415 COLL VENOUS BLD VENIPUNCTURE: CPT

## 2024-10-14 PROCEDURE — 80061 LIPID PANEL: CPT

## 2024-10-14 PROCEDURE — 83036 HEMOGLOBIN GLYCOSYLATED A1C: CPT

## 2024-10-14 PROCEDURE — 84443 ASSAY THYROID STIM HORMONE: CPT

## 2024-10-15 LAB — TSH SERPL DL<=0.005 MIU/L-ACNC: 1.21 UIU/ML (ref 0.3–4.2)

## 2024-10-22 ENCOUNTER — TRANSFERRED RECORDS (OUTPATIENT)
Dept: MULTI SPECIALTY CLINIC | Facility: CLINIC | Age: 54
End: 2024-10-22

## 2024-10-22 LAB — RETINOPATHY: NORMAL

## 2024-11-14 ENCOUNTER — TELEPHONE (OUTPATIENT)
Dept: FAMILY MEDICINE | Facility: CLINIC | Age: 54
End: 2024-11-14

## 2024-11-14 NOTE — TELEPHONE ENCOUNTER
Maira from patient employee advisement line calling regarding patient's blood sugar monitoring supplies. It looks like patient wasn't able to get the supplies so she will fax over form to team including information on what is needed in order for patient to receive the supplies.      Maira [phone]        Kyle Seals BSN RN  [facility]

## 2024-11-26 ENCOUNTER — OFFICE VISIT (OUTPATIENT)
Dept: FAMILY MEDICINE | Facility: CLINIC | Age: 54
End: 2024-11-26
Payer: COMMERCIAL

## 2024-11-26 VITALS
RESPIRATION RATE: 16 BRPM | HEIGHT: 65 IN | SYSTOLIC BLOOD PRESSURE: 109 MMHG | DIASTOLIC BLOOD PRESSURE: 68 MMHG | TEMPERATURE: 97.6 F | HEART RATE: 87 BPM | BODY MASS INDEX: 26.82 KG/M2 | OXYGEN SATURATION: 98 % | WEIGHT: 161 LBS

## 2024-11-26 DIAGNOSIS — N95.1 SYMPTOMATIC MENOPAUSAL OR FEMALE CLIMACTERIC STATES: Primary | ICD-10-CM

## 2024-11-26 PROCEDURE — 90471 IMMUNIZATION ADMIN: CPT | Performed by: FAMILY MEDICINE

## 2024-11-26 PROCEDURE — 90480 ADMN SARSCOV2 VAC 1/ONLY CMP: CPT | Performed by: FAMILY MEDICINE

## 2024-11-26 PROCEDURE — 90673 RIV3 VACCINE NO PRESERV IM: CPT | Performed by: FAMILY MEDICINE

## 2024-11-26 PROCEDURE — 99214 OFFICE O/P EST MOD 30 MIN: CPT | Mod: 25 | Performed by: FAMILY MEDICINE

## 2024-11-26 PROCEDURE — 91320 SARSCV2 VAC 30MCG TRS-SUC IM: CPT | Performed by: FAMILY MEDICINE

## 2024-11-26 RX ORDER — ESTRADIOL 0.5 MG/1
0.5 TABLET ORAL DAILY
Qty: 90 TABLET | Refills: 4 | Status: SHIPPED | OUTPATIENT
Start: 2024-11-26

## 2024-11-26 NOTE — PROGRESS NOTES
"Office Visit  [facility]  Date of Service: Nov 26, 2024      Subjective   Irene Terrazas is a 54 year old female who presents for   Chief Complaint   Patient presents with    Follow Up    Diabetes    Recheck Medication     Menopause  Fatigue, weight gain, moodiness  Menopause occurred 2022.  No hot flashes.    Notes A1c has been climbing. Weight gradually climbing, too.   Had been maintaining.  Always hungry.  On semaglutide for DM2.     Objective   /68 (BP Location: Right arm, Patient Position: Sitting, Cuff Size: Adult Regular)   Pulse 87   Temp 97.6  F (36.4  C) (Temporal)   Resp 16   Ht 1.651 m (5' 5\")   Wt 73 kg (161 lb)   LMP  (LMP Unknown)   SpO2 98%   BMI 26.79 kg/m   She reports that she has never smoked. She has never been exposed to tobacco smoke. She has never used smokeless tobacco.  Wt Readings from Last 6 Encounters:   11/26/24 73 kg (161 lb)   06/25/24 72.6 kg (160 lb)   05/21/24 71.7 kg (158 lb)   12/28/23 69.9 kg (154 lb)   12/27/22 70.3 kg (155 lb)   10/29/22 70.8 kg (156 lb)     Gen: Alert, no apparent distress.    No results found for any visits on 11/26/24.  Assessment & Plan     Symptomatic menopause.   Estradiol 0.5 mg daily. If not sufficient relief, may increase to 1 mg daily.  DM2, well controlled, with microalbuminuria.  Continue current medication.      Order Summary                                                      Symptomatic menopausal or female climacteric states  -     estradiol (ESTRACE) 0.5 MG tablet; Take 1 tablet (0.5 mg) by mouth daily.    BMI 26.0-26.9,adult    Other orders  -     INFLUENZA VACCINE TRIVALENT(FLUBLOK)  -     COVID-19 12+ (PFIZER)        Immunizations Administered       Name Date Dose VIS Date Route    COVID-19 12+ (Pfizer) 11/26/24  1:26 PM 0.3 mL EUI,10/17/2024,Given today Intramuscular    Influenza Vaccine IM 18-49 Yrs, RIV3 11/26/24  1:26 PM 0.5 mL 08/06/2021, Given Today Intramuscular          "   Future Appointments   Date Time Provider Department Center   5/22/2025  9:40 AM Tara Elliott MD [facility]       Completed by: Tara Elliott M.D., [facility]. 11/26/2024 1:48 PM.  The longitudinal plan of care for the diagnosis(es)/condition(s) as documented were addressed during this visit.   Due to the added complexity in care, I will continue to support Rebecca in the subsequent management and with ongoing continuity of care.   This transcription uses voice recognition software, which may contain typographical errors.  MDM: [facility] neighborhood: [facility] [id number], language: English,Prior to immunization administration, verified patients identity using patient s name and date of birth. Please see Immunization Activity for additional information.     Screening Questionnaire for Adult Immunization    Are you sick today?   No   Do you have allergies to medications, food, a vaccine component or latex?   No   Have you ever had a serious reaction after receiving a vaccination?   No   Do you have a long-term health problem with heart, lung, kidney, or metabolic disease (e.g., diabetes), asthma, a blood disorder, no spleen, complement component deficiency, a cochlear implant, or a spinal fluid leak?  Are you on long-term aspirin therapy?   Yes   Do you have cancer, leukemia, HIV/AIDS, or any other immune system problem?   No   Do you have a parent, brother, or sister with an immune system problem?   Don't Know   In the past 3 months, have you taken medications that affect  your immune system, such as prednisone, other steroids, or anticancer drugs; drugs for the treatment of rheumatoid arthritis, Crohn s disease, or psoriasis; or have you had radiation treatments?   No   Have you had a seizure, or a brain or other nervous system problem?   No   During the past year, have you received a transfusion of blood or blood    products, or been given immune (gamma) globulin or antiviral drug?   No    For women: Are you pregnant or is there a chance you could become       pregnant during the next month?   No   Have you received any vaccinations in the past 4 weeks?   No     Immunization questionnaire was positive for at least one answer.  Notified provider.      Patient instructed to remain in clinic for 15 minutes afterwards, and to report any adverse reactions.     Screening performed by Gabino Wesley MA on 11/26/2024 at 1:19 PM.       Answers submitted by the patient for this visit:  Diabetes Visit (Submitted on 11/26/2024)  Chief Complaint: Chronic problems general questions HPI Form  Frequency of checking blood sugars:: a few times a month  What time of day are you checking your blood sugars : before meals  Have you had any blood sugars above 200?: Yes  Have you had any blood sugars below 70?: No  Hypoglycemia symptoms:: shakiness  Diabetic concerns:: other  Paraesthesia present:: weight gain  Have you had a diabetic eye exam within the last year?: Yes  Date of last eye exam: : 10/22/2024  Where was this eye exam done?: [facility]  General Questionnaire (Submitted on 11/26/2024)  Chief Complaint: Chronic problems general questions HPI Form  How many servings of fruits and vegetables do you eat daily?: 0-1  On average, how many sweetened beverages do you drink each day (Examples: soda, juice, sweet tea, etc.  Do NOT count diet or artificially sweetened beverages)?: 0  How many minutes a day do you exercise enough to make your heart beat faster?: 30 to 60  How many days a week do you exercise enough to make your heart beat faster?: 4  How many days per week do you miss taking your medication?: 0  Questionnaire about: Chronic problems general questions HPI Form (Submitted on 11/26/2024)  Chief Complaint: Chronic problems general questions HPI Form

## 2024-11-26 NOTE — PATIENT INSTRUCTIONS
Menopause  Increase protein intake to 75 mg daily.   Increase plant fats - fish, nuts, seeds, avocado, olives.  Add estrogen.

## 2025-02-20 DIAGNOSIS — E78.5 HYPERLIPIDEMIA, UNSPECIFIED HYPERLIPIDEMIA TYPE: ICD-10-CM

## 2025-02-20 RX ORDER — SIMVASTATIN 20 MG
20 TABLET ORAL DAILY
Qty: 90 TABLET | Refills: 0 | Status: SHIPPED | OUTPATIENT
Start: 2025-02-20

## 2025-03-17 DIAGNOSIS — N95.1 SYMPTOMATIC MENOPAUSAL OR FEMALE CLIMACTERIC STATES: ICD-10-CM

## 2025-03-17 NOTE — TELEPHONE ENCOUNTER
Medication Question or Refill    Contacts       Contact Date/Time Type Contact Phone/Fax    03/17/2025 09:50 AM CDT Fax (Incoming) [facility]/pharmacy #[id number] - [place], [place] - [address] (Pharmacy) [phone]        What medication are you calling about (include dose and sig)?: estradiol (ESTRACE) 0.5 MG tablet     Preferred Pharmacy:   [facility]/pharmacy #[id number] - [place], [place] - [address]  [address] [address]  Phone: [phone] Fax: [phone]    Controlled Substance Agreement on file:   CSA -- Patient Level:     [Media Unavailable] Controlled Substance Agreement - Opioid - Scan on 5/23/2024  9:45 AM   [Media Unavailable] Controlled Substance Agreement - Opioid - Scan on 11/5/2018       Who prescribed the medication?: Dr. Elliott    Do you need a refill? Yes    When did you use the medication last? N/A    Patient offered an appointment? No    Do you have any questions or concerns?  Yes: PHARMACY REQUESTING A REFILL FOR 90 DAYS.      Could we send this information to you in [facility] [place] or would you prefer to receive a phone call?:   Patient would prefer a phone call   Okay to leave a detailed message?: Yes at Home number on file [phone] (home)

## 2025-03-18 NOTE — TELEPHONE ENCOUNTER
RN attempted to contact patient, but no answer. Left message on patient's voice mail to call clinic back.    If patient calls back, please let her know that she has refills remaining. 11/26/24 # 90 with 4 refills sent to [facility]. Thanks    Taty Huang RN  [facility]     Disp Refills Start End JUNIOR   estradiol (ESTRACE) 0.5 MG tablet 90 tablet 4 11/26/2024 -- No   Sig - Route: Take 1 tablet (0.5 mg) by mouth daily. - Oral   Sent to pharmacy as: Estradiol 0.5 MG Oral Tablet (ESTRACE)   Class: E-Prescribe       Fabiola Cronin, RN to [facility]    3/17/25 10:28 AM  This patient should have medication left on last order OR refills on file at preferred pharmacy. Please let patient know.  Orders ARE for 90 day supply with a bunch of refills.

## 2025-03-20 RX ORDER — ESTRADIOL 0.5 MG/1
0.5 TABLET ORAL DAILY
Qty: 90 TABLET | Refills: 4 | OUTPATIENT
Start: 2025-03-20

## 2025-03-20 NOTE — TELEPHONE ENCOUNTER
Attempt #3. LM on VM.   Writer called [facility] pharmacy, confirmed prescription is in process to be refill.     Denied refill as prescription still have 4 refills left.      Lizet WEEKS RN  [facility]

## 2025-04-26 ENCOUNTER — HEALTH MAINTENANCE LETTER (OUTPATIENT)
Age: 55
End: 2025-04-26

## 2025-05-05 ENCOUNTER — PATIENT OUTREACH (OUTPATIENT)
Dept: CARE COORDINATION | Facility: CLINIC | Age: 55
End: 2025-05-05
Payer: COMMERCIAL

## 2025-05-18 DIAGNOSIS — E78.5 HYPERLIPIDEMIA, UNSPECIFIED HYPERLIPIDEMIA TYPE: ICD-10-CM

## 2025-05-18 RX ORDER — SIMVASTATIN 20 MG
20 TABLET ORAL DAILY
Qty: 90 TABLET | Refills: 1 | Status: SHIPPED | OUTPATIENT
Start: 2025-05-18

## 2025-05-25 DIAGNOSIS — F41.1 GENERALIZED ANXIETY DISORDER: ICD-10-CM

## 2025-05-25 DIAGNOSIS — F40.01 AGORAPHOBIA WITH PANIC DISORDER: ICD-10-CM

## 2025-05-25 RX ORDER — HYDROXYZINE PAMOATE 50 MG/1
50 CAPSULE ORAL
Qty: 90 CAPSULE | Refills: 1 | Status: SHIPPED | OUTPATIENT
Start: 2025-05-25

## 2025-07-11 ENCOUNTER — MYC MEDICAL ADVICE (OUTPATIENT)
Dept: FAMILY MEDICINE | Facility: CLINIC | Age: 55
End: 2025-07-11
Payer: COMMERCIAL

## 2025-07-11 DIAGNOSIS — E11.29 TYPE 2 DIABETES MELLITUS WITH MICROALBUMINURIA (H): ICD-10-CM

## 2025-07-11 DIAGNOSIS — R80.9 TYPE 2 DIABETES MELLITUS WITH MICROALBUMINURIA (H): ICD-10-CM

## 2025-07-11 DIAGNOSIS — R73.9 HYPERGLYCEMIA: Primary | ICD-10-CM

## 2025-07-11 DIAGNOSIS — Z79.899 MEDICATION MANAGEMENT: ICD-10-CM

## 2025-07-12 DIAGNOSIS — R80.9 TYPE 2 DIABETES MELLITUS WITH MICROALBUMINURIA, WITHOUT LONG-TERM CURRENT USE OF INSULIN (H): ICD-10-CM

## 2025-07-12 DIAGNOSIS — E11.29 TYPE 2 DIABETES MELLITUS WITH MICROALBUMINURIA, WITHOUT LONG-TERM CURRENT USE OF INSULIN (H): ICD-10-CM

## 2025-07-15 NOTE — TELEPHONE ENCOUNTER
Orders are in.     Diagnoses and all orders for this visit:    Hyperglycemia  -     Hemoglobin A1c; Future    Type 2 diabetes mellitus with microalbuminuria (H)  -     Albumin Random Urine Quantitative with Creat Ratio; Future  -     Cancel: BASIC METABOLIC PANEL; Future  -     Lipid panel reflex to direct LDL Fasting; Future  -     Comprehensive metabolic panel (BMP + Alb, Alk Phos, ALT, AST, Total. Bili, TP); Future    Medication management  -     Urine Drug Screen; Future    Other orders  -     PRIMARY CARE FOLLOW-UP SCHEDULING; Future  -     REVIEW OF HEALTH MAINTENANCE PROTOCOL ORDERS

## 2025-07-15 NOTE — TELEPHONE ENCOUNTER
Dr. Elliott,    Please review [employer] message.  Pended.    Onur PIZARRO RN  [facility]

## 2025-07-28 ENCOUNTER — LAB (OUTPATIENT)
Dept: LAB | Facility: CLINIC | Age: 55
End: 2025-07-28
Attending: FAMILY MEDICINE
Payer: COMMERCIAL

## 2025-07-28 DIAGNOSIS — Z79.899 MEDICATION MANAGEMENT: ICD-10-CM

## 2025-07-28 DIAGNOSIS — R73.9 HYPERGLYCEMIA: ICD-10-CM

## 2025-07-28 DIAGNOSIS — R80.9 TYPE 2 DIABETES MELLITUS WITH MICROALBUMINURIA (H): ICD-10-CM

## 2025-07-28 DIAGNOSIS — E11.29 TYPE 2 DIABETES MELLITUS WITH MICROALBUMINURIA (H): ICD-10-CM

## 2025-07-28 LAB
ALBUMIN SERPL BCG-MCNC: 4.4 G/DL (ref 3.5–5.2)
ALP SERPL-CCNC: 48 U/L (ref 40–150)
ALT SERPL W P-5'-P-CCNC: 20 U/L (ref 0–50)
AMPHETAMINES UR QL SCN: NORMAL
ANION GAP SERPL CALCULATED.3IONS-SCNC: 9 MMOL/L (ref 7–15)
AST SERPL W P-5'-P-CCNC: 28 U/L (ref 0–45)
BARBITURATES UR QL SCN: NORMAL
BENZODIAZ UR QL SCN: NORMAL
BILIRUB SERPL-MCNC: 0.4 MG/DL
BUN SERPL-MCNC: 11.6 MG/DL (ref 6–20)
BZE UR QL SCN: NORMAL
CALCIUM SERPL-MCNC: 9.4 MG/DL (ref 8.8–10.4)
CANNABINOIDS UR QL SCN: NORMAL
CHLORIDE SERPL-SCNC: 104 MMOL/L (ref 98–107)
CHOLEST SERPL-MCNC: 165 MG/DL
CREAT SERPL-MCNC: 0.92 MG/DL (ref 0.51–0.95)
CREAT UR-MCNC: 215 MG/DL
EGFRCR SERPLBLD CKD-EPI 2021: 73 ML/MIN/1.73M2
EST. AVERAGE GLUCOSE BLD GHB EST-MCNC: 131 MG/DL
FASTING STATUS PATIENT QL REPORTED: YES
FASTING STATUS PATIENT QL REPORTED: YES
FENTANYL UR QL: NORMAL
GLUCOSE SERPL-MCNC: 138 MG/DL (ref 70–99)
HBA1C MFR BLD: 6.2 % (ref 0–5.6)
HCO3 SERPL-SCNC: 26 MMOL/L (ref 22–29)
HDLC SERPL-MCNC: 64 MG/DL
LDLC SERPL CALC-MCNC: 83 MG/DL
MICROALBUMIN UR-MCNC: <12 MG/L
MICROALBUMIN/CREAT UR: NORMAL MG/G{CREAT}
NONHDLC SERPL-MCNC: 101 MG/DL
OPIATES UR QL SCN: NORMAL
PCP QUAL URINE (ROCHE): NORMAL
POTASSIUM SERPL-SCNC: 4.7 MMOL/L (ref 3.4–5.3)
PROT SERPL-MCNC: 7.2 G/DL (ref 6.4–8.3)
SODIUM SERPL-SCNC: 139 MMOL/L (ref 135–145)
TRIGL SERPL-MCNC: 88 MG/DL

## 2025-07-28 PROCEDURE — 80053 COMPREHEN METABOLIC PANEL: CPT

## 2025-07-28 PROCEDURE — 82043 UR ALBUMIN QUANTITATIVE: CPT

## 2025-07-28 PROCEDURE — 80061 LIPID PANEL: CPT

## 2025-07-28 PROCEDURE — 83036 HEMOGLOBIN GLYCOSYLATED A1C: CPT

## 2025-07-28 PROCEDURE — 80307 DRUG TEST PRSMV CHEM ANLYZR: CPT

## 2025-07-28 PROCEDURE — 82570 ASSAY OF URINE CREATININE: CPT | Mod: 59

## 2025-07-28 PROCEDURE — 36415 COLL VENOUS BLD VENIPUNCTURE: CPT

## 2025-07-29 ENCOUNTER — RESULTS FOLLOW-UP (OUTPATIENT)
Dept: FAMILY MEDICINE | Facility: CLINIC | Age: 55
End: 2025-07-29
Payer: COMMERCIAL

## 2025-07-29 DIAGNOSIS — E11.29 TYPE 2 DIABETES MELLITUS WITH MICROALBUMINURIA (H): ICD-10-CM

## 2025-07-29 DIAGNOSIS — E78.5 HYPERLIPIDEMIA LDL GOAL <100: Primary | ICD-10-CM

## 2025-07-29 DIAGNOSIS — R80.9 TYPE 2 DIABETES MELLITUS WITH MICROALBUMINURIA (H): ICD-10-CM

## 2025-07-30 NOTE — TELEPHONE ENCOUNTER
Dr. Elliott - Would Petrona be appropriate for patient? If so, writer would recommend scheduling visit to discuss new medication.    Kaila Ewing RN  [facility]

## 2025-07-31 RX ORDER — TIRZEPATIDE 5 MG/.5ML
5 INJECTION, SOLUTION SUBCUTANEOUS
Qty: 2 ML | Refills: 0 | Status: SHIPPED | OUTPATIENT
Start: 2025-07-31 | End: 2025-08-28

## 2025-07-31 RX ORDER — TIRZEPATIDE 2.5 MG/.5ML
2.5 INJECTION, SOLUTION SUBCUTANEOUS
Qty: 2 ML | Refills: 0 | Status: SHIPPED | OUTPATIENT
Start: 2025-07-31 | End: 2025-08-28

## 2025-07-31 NOTE — TELEPHONE ENCOUNTER
"Yes Mounjaro would be a good option for her, primarily for treatment of her diabetes.   I sent in a prescription.    Diagnoses and all orders for this visit:    Hyperlipidemia LDL goal <100    Type 2 diabetes mellitus with microalbuminuria (H)  -     MOUNJARO 2.5 MG/0.5ML SOAJ auto-injector pen; Inject 0.5 mLs (2.5 mg) subcutaneously every 7 days for 28 days.  -     MOUNJARO 5 MG/0.5ML SOAJ auto-injector pen; Inject 0.5 mLs (5 mg) subcutaneously every 7 days for 28 days.    Hemoglobin A1c  Lab Results   Component Value Date    A1C 6.2 (H) 07/28/2025    A1C 6.0 (H) 10/14/2024    A1C 5.6 12/28/2023     Creatinine  Lab Results   Component Value Date    CR 0.92 07/28/2025     Microalbumin:Cr Ratio  Lab Results   Component Value Date    [facility]  12/13/2021      Comment:      Unable to calculate:  Urine creatinine or microalbumin value below detectable level    [facility]  09/24/2020      Comment:      \"Unable to calculate: Creatinine and/or Microalbumin value below detectable level\"    [facility] 3.3 10/10/2019     TSH  TSH   Date Value Ref Range Status   10/14/2024 1.21 0.30 - 4.20 uIU/mL Final   12/21/2020 1.31 0.30 - 5.00 uIU/mL Final      LDL  Cholesterol   Date Value Ref Range Status   07/28/2025 165 <200 mg/dL Final   10/14/2024 162 <200 mg/dL Final     Direct Measure HDL   Date Value Ref Range Status   07/28/2025 64 >=50 mg/dL Final   10/14/2024 60 >=50 mg/dL Final     LDL Cholesterol Calculated   Date Value Ref Range Status   07/28/2025 83 <100 mg/dL Final     Comment:     LDL calculated using the Friedewald equation.   10/14/2024 83 <100 mg/dL Final     LDL Cholesterol Direct   Date Value Ref Range Status   03/10/2014 77 <130 mg/dL Final     Triglycerides   Date Value Ref Range Status   07/28/2025 88 <150 mg/dL Final   10/14/2024 93 <150 mg/dL Final     No results found for: \"CHOLHDLRATIO\"    BMI Readings from Last 3 Encounters:   11/26/24 26.79 kg/m    06/25/24 26.63 kg/m    05/21/24 26.48 kg/m        Wt Readings " from Last 12 Encounters:   11/26/24 73 kg (161 lb)   06/25/24 72.6 kg (160 lb)   05/21/24 71.7 kg (158 lb)   12/28/23 69.9 kg (154 lb)   12/27/22 70.3 kg (155 lb)   10/29/22 70.8 kg (156 lb)   02/14/22 68 kg (150 lb)   07/16/21 67.1 kg (148 lb)   11/12/20 70.3 kg (155 lb)   09/24/20 71.2 kg (157 lb)   02/13/20 69.4 kg (153 lb)   10/10/19 71.2 kg (157 lb)

## 2025-08-21 DIAGNOSIS — R80.9 TYPE 2 DIABETES MELLITUS WITH MICROALBUMINURIA, WITHOUT LONG-TERM CURRENT USE OF INSULIN (H): ICD-10-CM

## 2025-08-21 DIAGNOSIS — E11.29 TYPE 2 DIABETES MELLITUS WITH MICROALBUMINURIA, WITHOUT LONG-TERM CURRENT USE OF INSULIN (H): ICD-10-CM

## 2025-08-21 RX ORDER — LISINOPRIL 5 MG/1
5 TABLET ORAL DAILY
Qty: 90 TABLET | Refills: 2 | OUTPATIENT
Start: 2025-08-21